# Patient Record
Sex: FEMALE | Race: OTHER | NOT HISPANIC OR LATINO | URBAN - METROPOLITAN AREA
[De-identification: names, ages, dates, MRNs, and addresses within clinical notes are randomized per-mention and may not be internally consistent; named-entity substitution may affect disease eponyms.]

---

## 2017-02-09 ENCOUNTER — INPATIENT (INPATIENT)
Facility: HOSPITAL | Age: 82
LOS: 11 days | Discharge: NURSING, REHAB OR LONG-TERM CARE | DRG: 193 | End: 2017-02-21
Attending: INTERNAL MEDICINE | Admitting: INTERNAL MEDICINE
Payer: MEDICAID

## 2017-02-09 VITALS
OXYGEN SATURATION: 96 % | TEMPERATURE: 97 F | SYSTOLIC BLOOD PRESSURE: 166 MMHG | RESPIRATION RATE: 20 BRPM | HEART RATE: 88 BPM | DIASTOLIC BLOOD PRESSURE: 92 MMHG

## 2017-02-09 LAB
BASE EXCESS BLDV CALC-SCNC: -1 MMOL/L — SIGNIFICANT CHANGE UP
GAS PNL BLDV: SIGNIFICANT CHANGE UP
HCO3 BLDV-SCNC: 24 MMOL/L — SIGNIFICANT CHANGE UP (ref 20–27)
LACTATE SERPL-SCNC: 1.7 MMOL/L — SIGNIFICANT CHANGE UP (ref 0.5–2)
PCO2 BLDV: 39 MMHG — LOW (ref 41–51)
PH BLDV: 7.4 — SIGNIFICANT CHANGE UP (ref 7.32–7.43)
PO2 BLDV: 46 MMHG — SIGNIFICANT CHANGE UP
SAO2 % BLDV: 82 % — SIGNIFICANT CHANGE UP

## 2017-02-09 PROCEDURE — 99291 CRITICAL CARE FIRST HOUR: CPT | Mod: 25

## 2017-02-09 PROCEDURE — 93010 ELECTROCARDIOGRAM REPORT: CPT

## 2017-02-09 RX ORDER — IPRATROPIUM/ALBUTEROL SULFATE 18-103MCG
3 AEROSOL WITH ADAPTER (GRAM) INHALATION
Qty: 0 | Refills: 0 | Status: COMPLETED | OUTPATIENT
Start: 2017-02-09 | End: 2017-02-09

## 2017-02-09 RX ORDER — MAGNESIUM SULFATE 500 MG/ML
2 VIAL (ML) INJECTION ONCE
Qty: 0 | Refills: 0 | Status: COMPLETED | OUTPATIENT
Start: 2017-02-09 | End: 2017-02-09

## 2017-02-09 RX ADMIN — Medication 125 MILLIGRAM(S): at 23:30

## 2017-02-09 RX ADMIN — Medication 3 MILLILITER(S): at 23:44

## 2017-02-09 RX ADMIN — Medication 50 GRAM(S): at 23:42

## 2017-02-09 RX ADMIN — Medication 3 MILLILITER(S): at 23:15

## 2017-02-09 RX ADMIN — Medication 3 MILLILITER(S): at 23:30

## 2017-02-09 NOTE — ED ADULT TRIAGE NOTE - CHIEF COMPLAINT QUOTE
Per family member " She was seen at [facility] and tested positive for flu.  Her asthma is acting up tonight and earlier tonight she had a fever.  I gave her tylenol.

## 2017-02-09 NOTE — ED PROVIDER NOTE - OBJECTIVE STATEMENT
81F hx asthma, hyperthyroid, c/o asthma exacerbation. pt was recently admitted to [facility] (discharged yesterday) for asthma and flu.  pt taking tamiflu, albuterol and prednisone. however SOB and cough worsening today.  pt unable to ambulate without feeling short of breath.  no chest pain. no recent travel. no vomiting today. +subjective fevers. no hx of intubation.

## 2017-02-09 NOTE — ED PROVIDER NOTE - CRITICAL CARE PROVIDED
documentation/additional history taking/direct patient care (not related to procedure)/interpretation of diagnostic studies

## 2017-02-09 NOTE — ED PROVIDER NOTE - PROGRESS NOTE DETAILS
persistent wheezing, pt with supraclavicular retractions, will try bipap and admit for continued treatment. pt refusing bipap, unable to tolerate

## 2017-02-09 NOTE — ED ADULT NURSE NOTE - OBJECTIVE STATEMENT
82 y/o female with hx of asthma arrived to [facility] ER reporting difficulty breathing accompanied with abdominal pain and nausea starting today. Pt verbalized that she had a fever at home and took tylenol. Pt verbalized that she was dx with the flu on 2/7/17 at [facility] and began treatment with tamiflu. Upon assessment, labored breathing noted, abdomen soft, lung fields WNL, pulses palpable. Pt denies headache, chest pain, dizziness, blurred vision, slurred speech, vomiting, diarrhea, chills, LOC, weakness, fatigue, recent travel, and recent injury. Care in progress. Awaiting disposition

## 2017-02-09 NOTE — ED ADULT NURSE NOTE - CHIEF COMPLAINT QUOTE
Per family member " She was seen at [facility] and tested positive for flu.  Her asthma is acting up tonight and earlier tonight she had a fever.  I gave her tylenol.

## 2017-02-10 DIAGNOSIS — F32.9 MAJOR DEPRESSIVE DISORDER, SINGLE EPISODE, UNSPECIFIED: ICD-10-CM

## 2017-02-10 DIAGNOSIS — E05.90 THYROTOXICOSIS, UNSPECIFIED WITHOUT THYROTOXIC CRISIS OR STORM: ICD-10-CM

## 2017-02-10 DIAGNOSIS — J45.901 UNSPECIFIED ASTHMA WITH (ACUTE) EXACERBATION: ICD-10-CM

## 2017-02-10 DIAGNOSIS — Z41.8 ENCOUNTER FOR OTHER PROCEDURES FOR PURPOSES OTHER THAN REMEDYING HEALTH STATE: ICD-10-CM

## 2017-02-10 DIAGNOSIS — R63.8 OTHER SYMPTOMS AND SIGNS CONCERNING FOOD AND FLUID INTAKE: ICD-10-CM

## 2017-02-10 DIAGNOSIS — F41.9 ANXIETY DISORDER, UNSPECIFIED: ICD-10-CM

## 2017-02-10 DIAGNOSIS — J11.1 INFLUENZA DUE TO UNIDENTIFIED INFLUENZA VIRUS WITH OTHER RESPIRATORY MANIFESTATIONS: ICD-10-CM

## 2017-02-10 DIAGNOSIS — Z90.710 ACQUIRED ABSENCE OF BOTH CERVIX AND UTERUS: Chronic | ICD-10-CM

## 2017-02-10 LAB
ALBUMIN SERPL ELPH-MCNC: 3.1 G/DL — LOW (ref 3.4–5)
ALP SERPL-CCNC: 78 U/L — SIGNIFICANT CHANGE UP (ref 40–120)
ALT FLD-CCNC: 23 U/L — SIGNIFICANT CHANGE UP (ref 12–42)
ANION GAP SERPL CALC-SCNC: 12 MMOL/L — SIGNIFICANT CHANGE UP (ref 9–16)
ANION GAP SERPL CALC-SCNC: 8 MMOL/L — LOW (ref 9–16)
APTT BLD: 29.2 SEC — SIGNIFICANT CHANGE UP (ref 27.5–37.4)
AST SERPL-CCNC: 24 U/L — SIGNIFICANT CHANGE UP (ref 15–37)
BASE EXCESS BLDA CALC-SCNC: -3.5 MMOL/L — LOW (ref -2–3)
BASE EXCESS BLDA CALC-SCNC: -5.7 MMOL/L — LOW (ref -2–3)
BASOPHILS NFR BLD AUTO: 0.8 % — SIGNIFICANT CHANGE UP (ref 0–2)
BILIRUB SERPL-MCNC: 0.3 MG/DL — SIGNIFICANT CHANGE UP (ref 0.2–1.2)
BUN SERPL-MCNC: 14 MG/DL — SIGNIFICANT CHANGE UP (ref 7–23)
BUN SERPL-MCNC: 17 MG/DL — SIGNIFICANT CHANGE UP (ref 7–23)
CALCIUM SERPL-MCNC: 8 MG/DL — LOW (ref 8.5–10.5)
CALCIUM SERPL-MCNC: 8 MG/DL — LOW (ref 8.5–10.5)
CHLORIDE SERPL-SCNC: 107 MMOL/L — SIGNIFICANT CHANGE UP (ref 96–108)
CHLORIDE SERPL-SCNC: 108 MMOL/L — SIGNIFICANT CHANGE UP (ref 96–108)
CO2 SERPL-SCNC: 22 MMOL/L — SIGNIFICANT CHANGE UP (ref 22–31)
CO2 SERPL-SCNC: 27 MMOL/L — SIGNIFICANT CHANGE UP (ref 22–31)
CREAT SERPL-MCNC: 0.66 MG/DL — SIGNIFICANT CHANGE UP (ref 0.5–1.3)
CREAT SERPL-MCNC: 0.79 MG/DL — SIGNIFICANT CHANGE UP (ref 0.5–1.3)
EOSINOPHIL NFR BLD AUTO: 0.1 % — SIGNIFICANT CHANGE UP (ref 0–6)
GAS PNL BLDA: SIGNIFICANT CHANGE UP
GAS PNL BLDA: SIGNIFICANT CHANGE UP
GLUCOSE SERPL-MCNC: 107 MG/DL — HIGH (ref 70–99)
GLUCOSE SERPL-MCNC: 270 MG/DL — HIGH (ref 70–99)
HBA1C BLD-MCNC: 6.5 % — HIGH (ref 4.8–5.6)
HCO3 BLDA-SCNC: 18 MMOL/L — LOW (ref 21–28)
HCO3 BLDA-SCNC: 21 MMOL/L — SIGNIFICANT CHANGE UP (ref 21–28)
HCT VFR BLD CALC: 37.6 % — SIGNIFICANT CHANGE UP (ref 34.5–45)
HCT VFR BLD CALC: 39.4 % — SIGNIFICANT CHANGE UP (ref 34.5–45)
HGB BLD-MCNC: 12.5 G/DL — SIGNIFICANT CHANGE UP (ref 11.5–15.5)
HGB BLD-MCNC: 12.9 G/DL — SIGNIFICANT CHANGE UP (ref 11.5–15.5)
INR BLD: 0.99 — SIGNIFICANT CHANGE UP (ref 0.88–1.16)
LYMPHOCYTES # BLD AUTO: 43.3 % — SIGNIFICANT CHANGE UP (ref 13–44)
MAGNESIUM SERPL-MCNC: 2.7 MG/DL — HIGH (ref 1.6–2.4)
MCHC RBC-ENTMCNC: 28.7 PG — SIGNIFICANT CHANGE UP (ref 27–34)
MCHC RBC-ENTMCNC: 29 PG — SIGNIFICANT CHANGE UP (ref 27–34)
MCHC RBC-ENTMCNC: 32.7 G/DL — SIGNIFICANT CHANGE UP (ref 32–36)
MCHC RBC-ENTMCNC: 33.2 G/DL — SIGNIFICANT CHANGE UP (ref 32–36)
MCV RBC AUTO: 87.2 FL — SIGNIFICANT CHANGE UP (ref 80–100)
MCV RBC AUTO: 87.6 FL — SIGNIFICANT CHANGE UP (ref 80–100)
MONOCYTES NFR BLD AUTO: 10.7 % — SIGNIFICANT CHANGE UP (ref 2–14)
NEUTROPHILS NFR BLD AUTO: 45.1 % — SIGNIFICANT CHANGE UP (ref 43–77)
NT-PROBNP SERPL-SCNC: 379 PG/ML — HIGH
PCO2 BLDA: 33 MMHG — SIGNIFICANT CHANGE UP (ref 32–45)
PCO2 BLDA: 35 MMHG — SIGNIFICANT CHANGE UP (ref 32–45)
PH BLDA: 7.37 — SIGNIFICANT CHANGE UP (ref 7.35–7.45)
PH BLDA: 7.39 — SIGNIFICANT CHANGE UP (ref 7.35–7.45)
PHOSPHATE SERPL-MCNC: 2.6 MG/DL — SIGNIFICANT CHANGE UP (ref 2.5–4.5)
PLATELET # BLD AUTO: 228 K/UL — SIGNIFICANT CHANGE UP (ref 150–400)
PLATELET # BLD AUTO: 235 K/UL — SIGNIFICANT CHANGE UP (ref 150–400)
PO2 BLDA: 142 MMHG — HIGH (ref 83–108)
PO2 BLDA: 263 MMHG — HIGH (ref 83–108)
POTASSIUM SERPL-MCNC: 3.5 MMOL/L — SIGNIFICANT CHANGE UP (ref 3.5–5.3)
POTASSIUM SERPL-MCNC: 3.6 MMOL/L — SIGNIFICANT CHANGE UP (ref 3.5–5.3)
POTASSIUM SERPL-SCNC: 3.5 MMOL/L — SIGNIFICANT CHANGE UP (ref 3.5–5.3)
POTASSIUM SERPL-SCNC: 3.6 MMOL/L — SIGNIFICANT CHANGE UP (ref 3.5–5.3)
PROT SERPL-MCNC: 6.7 G/DL — SIGNIFICANT CHANGE UP (ref 6.4–8.2)
PROTHROM AB SERPL-ACNC: 11 SEC — SIGNIFICANT CHANGE UP (ref 10–13.1)
RBC # BLD: 4.31 M/UL — SIGNIFICANT CHANGE UP (ref 3.8–5.2)
RBC # BLD: 4.5 M/UL — SIGNIFICANT CHANGE UP (ref 3.8–5.2)
RBC # FLD: 12.8 % — SIGNIFICANT CHANGE UP (ref 10.3–16.9)
RBC # FLD: 13 % — SIGNIFICANT CHANGE UP (ref 10.3–16.9)
SAO2 % BLDA: 100 % — SIGNIFICANT CHANGE UP (ref 95–100)
SAO2 % BLDA: 99 % — SIGNIFICANT CHANGE UP (ref 95–100)
SODIUM SERPL-SCNC: 141 MMOL/L — SIGNIFICANT CHANGE UP (ref 135–145)
SODIUM SERPL-SCNC: 143 MMOL/L — SIGNIFICANT CHANGE UP (ref 135–145)
T3 SERPL-MCNC: 109 NG/DL — SIGNIFICANT CHANGE UP (ref 80–200)
T4 AB SER-ACNC: 10.8 UG/DL — SIGNIFICANT CHANGE UP (ref 3.17–11.72)
THEOPHYLLINE SERPL-MCNC: <2 UG/ML — LOW (ref 10–20)
TSH SERPL-MCNC: <0.004 UIU/ML — LOW (ref 0.35–4.94)
WBC # BLD: 15.1 K/UL — HIGH (ref 3.8–10.5)
WBC # BLD: 9.1 K/UL — SIGNIFICANT CHANGE UP (ref 3.8–10.5)
WBC # FLD AUTO: 15.1 K/UL — HIGH (ref 3.8–10.5)
WBC # FLD AUTO: 9.1 K/UL — SIGNIFICANT CHANGE UP (ref 3.8–10.5)

## 2017-02-10 PROCEDURE — 71020: CPT | Mod: 26

## 2017-02-10 PROCEDURE — 99222 1ST HOSP IP/OBS MODERATE 55: CPT | Mod: GC

## 2017-02-10 PROCEDURE — 99223 1ST HOSP IP/OBS HIGH 75: CPT | Mod: GC

## 2017-02-10 RX ORDER — HEPARIN SODIUM 5000 [USP'U]/ML
5000 INJECTION INTRAVENOUS; SUBCUTANEOUS EVERY 12 HOURS
Qty: 0 | Refills: 0 | Status: DISCONTINUED | OUTPATIENT
Start: 2017-02-10 | End: 2017-02-10

## 2017-02-10 RX ORDER — ALBUTEROL 90 UG/1
2.5 AEROSOL, METERED ORAL
Qty: 0 | Refills: 0 | Status: COMPLETED | OUTPATIENT
Start: 2017-02-10 | End: 2017-02-10

## 2017-02-10 RX ORDER — IPRATROPIUM/ALBUTEROL SULFATE 18-103MCG
3 AEROSOL WITH ADAPTER (GRAM) INHALATION EVERY 4 HOURS
Qty: 0 | Refills: 0 | Status: DISCONTINUED | OUTPATIENT
Start: 2017-02-10 | End: 2017-02-21

## 2017-02-10 RX ORDER — INSULIN LISPRO 100/ML
VIAL (ML) SUBCUTANEOUS
Qty: 0 | Refills: 0 | Status: DISCONTINUED | OUTPATIENT
Start: 2017-02-10 | End: 2017-02-21

## 2017-02-10 RX ORDER — FLUTICASONE PROPIONATE AND SALMETEROL 50; 250 UG/1; UG/1
1 POWDER ORAL; RESPIRATORY (INHALATION)
Qty: 0 | Refills: 0 | Status: DISCONTINUED | OUTPATIENT
Start: 2017-02-10 | End: 2017-02-16

## 2017-02-10 RX ORDER — IPRATROPIUM/ALBUTEROL SULFATE 18-103MCG
3 AEROSOL WITH ADAPTER (GRAM) INHALATION ONCE
Qty: 0 | Refills: 0 | Status: COMPLETED | OUTPATIENT
Start: 2017-02-10 | End: 2017-02-10

## 2017-02-10 RX ORDER — INFLUENZA VIRUS VACCINE 15; 15; 15; 15 UG/.5ML; UG/.5ML; UG/.5ML; UG/.5ML
0.5 SUSPENSION INTRAMUSCULAR ONCE
Qty: 0 | Refills: 0 | Status: DISCONTINUED | OUTPATIENT
Start: 2017-02-10 | End: 2017-02-10

## 2017-02-10 RX ORDER — IPRATROPIUM/ALBUTEROL SULFATE 18-103MCG
3 AEROSOL WITH ADAPTER (GRAM) INHALATION EVERY 6 HOURS
Qty: 0 | Refills: 0 | Status: DISCONTINUED | OUTPATIENT
Start: 2017-02-10 | End: 2017-02-10

## 2017-02-10 RX ORDER — HEPARIN SODIUM 5000 [USP'U]/ML
5000 INJECTION INTRAVENOUS; SUBCUTANEOUS EVERY 8 HOURS
Qty: 0 | Refills: 0 | Status: DISCONTINUED | OUTPATIENT
Start: 2017-02-10 | End: 2017-02-21

## 2017-02-10 RX ORDER — SODIUM CHLORIDE 9 MG/ML
250 INJECTION INTRAMUSCULAR; INTRAVENOUS; SUBCUTANEOUS ONCE
Qty: 0 | Refills: 0 | Status: DISCONTINUED | OUTPATIENT
Start: 2017-02-10 | End: 2017-02-10

## 2017-02-10 RX ORDER — MOMETASONE FUROATE 220 UG/1
1 INHALANT RESPIRATORY (INHALATION)
Qty: 0 | Refills: 0 | Status: DISCONTINUED | OUTPATIENT
Start: 2017-02-10 | End: 2017-02-10

## 2017-02-10 RX ADMIN — Medication 3 MILLILITER(S): at 04:10

## 2017-02-10 RX ADMIN — Medication 40 MILLIGRAM(S): at 05:46

## 2017-02-10 RX ADMIN — Medication 30 MILLIGRAM(S): at 10:00

## 2017-02-10 RX ADMIN — HEPARIN SODIUM 5000 UNIT(S): 5000 INJECTION INTRAVENOUS; SUBCUTANEOUS at 22:00

## 2017-02-10 RX ADMIN — ALBUTEROL 2.5 MILLIGRAM(S): 90 AEROSOL, METERED ORAL at 00:50

## 2017-02-10 RX ADMIN — ALBUTEROL 2.5 MILLIGRAM(S): 90 AEROSOL, METERED ORAL at 01:10

## 2017-02-10 RX ADMIN — Medication 3 MILLILITER(S): at 17:43

## 2017-02-10 RX ADMIN — Medication 3 MILLILITER(S): at 21:20

## 2017-02-10 RX ADMIN — Medication: at 12:32

## 2017-02-10 RX ADMIN — Medication 3 MILLILITER(S): at 10:17

## 2017-02-10 RX ADMIN — Medication 3 MILLILITER(S): at 05:45

## 2017-02-10 RX ADMIN — Medication 30 MILLIGRAM(S): at 17:24

## 2017-02-10 RX ADMIN — HEPARIN SODIUM 5000 UNIT(S): 5000 INJECTION INTRAVENOUS; SUBCUTANEOUS at 10:00

## 2017-02-10 RX ADMIN — Medication: at 07:32

## 2017-02-10 RX ADMIN — Medication 3 MILLILITER(S): at 14:46

## 2017-02-10 NOTE — PROGRESS NOTE ADULT - SUBJECTIVE AND OBJECTIVE BOX
HPI:    SUBJECTIVE: Patient seen and examined at bedside.     ROS:  CV: Denies chest pain  Resp: Denies SOB  GI: Denies abdominal pain, constipation, diarrhea, nausea, vomiting  : Denies dysuria  ID: Denies fevers, chills  MSK: Denies joint pain     OBJECTIVE:    VITAL SIGNS:  ICU Vital Signs Last 24 Hrs  T(C): 36.7, Max: 36.7 (02-10 @ 14:30)  T(F): 98.1, Max: 98.1 (02-10 @ 14:30)  HR: 84 (70 - 96)  BP: 116/50 (99/65 - 166/92)  BP(mean): 71 (69 - 92)  ABP: --  ABP(mean): --  RR: 32 (15 - 40)  SpO2: 99% (95% - 100%)        CAPILLARY BLOOD GLUCOSE  231 (10 Feb 2017 12:00)      PHYSICAL EXAM:    General: NAD, comfortable  HEENT: NCAT, PERRL, clear conjunctiva, no scleral icterus  Neck: supple, no JVD  Respiratory: CTA b/l, diffuse inspiratory and expiratory wheezing  Cardiovascular: RRR, normal S1S2, no M/R/G  Abdomen: soft, NT/ND, bowel sounds in all four quadrants, no palpable masses  Extremities: WWP, no clubbing, cyanosis, or edema  Neuro:     MEDICATIONS:  MEDICATIONS  (STANDING):  methimazole 10milliGRAM(s) Oral every 8 hours  ALBUTerol/ipratropium for Nebulization 3milliLiter(s) Nebulizer every 4 hours  insulin lispro (HumaLOG) corrective regimen sliding scale  SubCutaneous Before meals and at bedtime  fluticasone / salmeterol 250-50 MICROgram(s) Diskus 1Dose(s) Inhalation two times a day  oseltamivir 30milliGRAM(s) Oral every 12 hours  heparin  Injectable 5000Unit(s) SubCutaneous every 8 hours    MEDICATIONS  (PRN):      ALLERGIES:  Allergies    No Known Allergies    Intolerances        LABS:                        12.9   9.1   )-----------( 228      ( 10 Feb 2017 06:00 )             39.4     10 Feb 2017 06:00    141    |  107    |  14     ----------------------------<  270    3.6     |  22     |  0.66     Ca    8.0        10 Feb 2017 06:00  Phos  2.6       10 Feb 2017 06:00  Mg     2.7       10 Feb 2017 06:00    TPro  6.7    /  Alb  3.1    /  TBili  0.3    /  DBili  x      /  AST  24     /  ALT  23     /  AlkPhos  78     09 Feb 2017 23:39    PT/INR - ( 09 Feb 2017 23:39 )   PT: 11.0 sec;   INR: 0.99          PTT - ( 09 Feb 2017 23:39 )  PTT:29.2 sec      RADIOLOGY & ADDITIONAL TESTS: Reviewed.    Pulm  #Asthma exacerbation. Admitted to MICU on 2/10. Pt with asthma x 25 yrs, no intubations, recent (Feb 5-7) admission to [facility] for asthma exacerbation 2/2 influenza, here with worsening respiratory status likely due to inability to complete fill medications 2/2 underinsurance. Per patient's son (an anesthesiologist in [employer] now living in [place] 7yrs), he was giving her albuterol and nasonex at home, was unable to procure other medications. Pt placed on bipap in ED but was removing to go to restroom, resulting in worsening respiratory status, tachypnea. Respiratory status continued to improve on bipap. Pt now on NC satting well.   -c/w solumedrol 40 q8  -tamiflu  -duonebs 3ml q4h  -f/u IGE serum quant    Endo  #Hyperthyroidism  -c/w methimazole 10mg PO q8h  #Steroid use  -c/w ISS HPI:    SUBJECTIVE: Patient seen and examined at bedside.     ROS:  CV: Denies chest pain  Resp: Denies SOB  GI: Denies abdominal pain, constipation, diarrhea, nausea, vomiting  : Denies dysuria  ID: Denies fevers, chills  MSK: Denies joint pain     OBJECTIVE:    VITAL SIGNS:  ICU Vital Signs Last 24 Hrs  T(C): 36.7, Max: 36.7 (02-10 @ 14:30)  T(F): 98.1, Max: 98.1 (02-10 @ 14:30)  HR: 84 (70 - 96)  BP: 116/50 (99/65 - 166/92)  BP(mean): 71 (69 - 92)  ABP: --  ABP(mean): --  RR: 32 (15 - 40)  SpO2: 99% (95% - 100%)        CAPILLARY BLOOD GLUCOSE  231 (10 Feb 2017 12:00)      PHYSICAL EXAM:    General: NAD, comfortable  HEENT: NCAT, PERRL, clear conjunctiva, no scleral icterus  Neck: supple, no JVD  Respiratory: CTA b/l, diffuse inspiratory and expiratory wheezing  Cardiovascular: RRR, normal S1S2, no M/R/G  Abdomen: soft, NT/ND, bowel sounds in all four quadrants, no palpable masses  Extremities: WWP, no clubbing, cyanosis, or edema  Neuro:     MEDICATIONS:  MEDICATIONS  (STANDING):  methimazole 10milliGRAM(s) Oral every 8 hours  ALBUTerol/ipratropium for Nebulization 3milliLiter(s) Nebulizer every 4 hours  insulin lispro (HumaLOG) corrective regimen sliding scale  SubCutaneous Before meals and at bedtime  fluticasone / salmeterol 250-50 MICROgram(s) Diskus 1Dose(s) Inhalation two times a day  oseltamivir 30milliGRAM(s) Oral every 12 hours  heparin  Injectable 5000Unit(s) SubCutaneous every 8 hours    MEDICATIONS  (PRN):      ALLERGIES:  Allergies    No Known Allergies    Intolerances        LABS:                        12.9   9.1   )-----------( 228      ( 10 Feb 2017 06:00 )             39.4     10 Feb 2017 06:00    141    |  107    |  14     ----------------------------<  270    3.6     |  22     |  0.66     Ca    8.0        10 Feb 2017 06:00  Phos  2.6       10 Feb 2017 06:00  Mg     2.7       10 Feb 2017 06:00    TPro  6.7    /  Alb  3.1    /  TBili  0.3    /  DBili  x      /  AST  24     /  ALT  23     /  AlkPhos  78     09 Feb 2017 23:39    PT/INR - ( 09 Feb 2017 23:39 )   PT: 11.0 sec;   INR: 0.99          PTT - ( 09 Feb 2017 23:39 )  PTT:29.2 sec      RADIOLOGY & ADDITIONAL TESTS: Reviewed.    Pulm  #Asthma exacerbation. Admitted to MICU on 2/10. Pt with asthma x 25 yrs, no intubations, recent (Feb 5-7) admission to [facility] for asthma exacerbation 2/2 influenza, here with worsening respiratory status likely due to inability to complete fill medications 2/2 underinsurance. Per patient's son (an anesthesiologist in [employer] now living in [place]yrs), he was giving her albuterol and nasonex at home, was unable to procure other medications. Pt placed on bipap in ED but was removing to go to restroom, resulting in worsening respiratory status, tachypnea. Respiratory status continued to improve on bipap. Pt now on NC satting well.   -c/w solumedrol 40 q8  -tamiflu  -duonebs 3ml q4h  -f/u IGE serum quant    Endo  #Hyperthyroidism  -c/w methimazole 10mg PO q8h  #Steroid use  -c/w ISS    FEN: No IVF, replete lytes PRN, Consistent Carb w/ evening snack  PPX: HSQ  LINES: Peripheral   Walker: None  Dispo: MICU  Code: Full

## 2017-02-10 NOTE — ED ADULT NURSE REASSESSMENT NOTE - NS ED NURSE REASSESS COMMENT FT1
spoke to dr parr, aware pt is tachypneic to RR 40, saturating well on o2 3l NC, albuterol neb in progress. MD aware pt continuing to refuse bipap, also aware that patient just ate a croissant. discussed w son the importance of the bipap machine, states that will try again in 30 min. also, awaiting dr parr to come discuss with patient the importance of bipap,  phone at bedside. initiated on continuos pulse ox. will monitor

## 2017-02-10 NOTE — CONSULT NOTE ADULT - ATTENDING COMMENTS
82F as per her son who is anesthesiologist in [employer], she has asthma for 20 y, nonsmoker, goes to hospital for 1-2 days x2/per year. no intubations. exertional tolerance previously excellent, worsened in last year to 1 block. recently treated in ED at [facility] for asthma for 3 days, DC home 2-7. felt improved and able to eat/drink normally but worsened 2-9 evening. currently speaking voluntarily about various subjects. polyphonic low pitched wheeze. plan to admit to MICU, treat alburerol q2, steroids, azithromycin, BIPAP. 82F as per her son who is anesthesiologist in [employer], she has asthma for 20 y, nonsmoker, goes to hospital for 1-2 days x2/per year. no intubations. exertional tolerance previously excellent, worsened in last year to 1 block. recently treated in ED at [facility] for asthma for 3 days, DC home 2-7. felt improved and able to eat/drink normally but worsened 2-9 evening. currently speaking voluntarily about various subjects. polyphonic low pitched wheeze. plan to admit to MICU, treat alburerol q2, steroids, azithromycin, BIPAP.  also of note, recently diagnosed with hyperthyroid at [facility], will recheck TFTs. 82F as per her son who is anesthesiologist in [employer] [facility], she has asthma for 20 y, nonsmoker, goes to hospital for 1-2 days x2/per year. no intubations. exertional tolerance previously excellent, worsened in last year to 1 block. recently treated in ED at [facility] for asthma for 3 days, DC home 2-7. felt improved and able to eat/drink normally but worsened 2-9 evening. currently speaking voluntarily about various subjects. RR 20's Vt 300's, polyphonic low pitched wheeze. plan to admit to MICU, treat alburerol q2, steroids, azithromycin, BIPAP.  also of note, recently diagnosed with hyperthyroid at [facility], will recheck TFTs.

## 2017-02-10 NOTE — CONSULT NOTE ADULT - PROBLEM SELECTOR RECOMMENDATION 9
Pt with asthma x 25 yrs, no intubations, recent (Feb 5-7) admission to [facility] for asthma exacerbation 2/2 influenza, here with worsening respiratory status likely due to inability to complete fill medications 2/2 underinsurance.   -Pt placed on bipap in ED but was removing to go to restroom, resulting in worsening respiratory status, tachypnea Pt with asthma x 25 yrs, no intubations, recent (Feb 5-7) admission to [facility] for asthma exacerbation 2/2 influenza, here with worsening respiratory status likely due to inability to complete fill medications 2/2 underinsurance. Per patient's son (an anesthesiologist in [employer] now living in [place]), he was giving her albuterol and nasonex at home, was unable to procure other medications.   -Pt placed on bipap in ED but was removing to go to restroom, resulting in worsening respiratory status, tachypnea  -ABG concerning due to normalization of CO2 - may indicate that patient tiring out.   -respiratory status now improving on bipap, f/u repeat ABG  -solumedrol 40 q8  -tamiflu  -nebs q2 standing

## 2017-02-10 NOTE — H&P ADULT. - ASSESSMENT
83yo F w/ PMH asthma presenting with asthma exacerbation in setting of recent influenza and noncompliance with medications.

## 2017-02-10 NOTE — H&P ADULT. - PROBLEM SELECTOR PLAN 2
recently diagnosed with influenza at [facility]. Completed 3 days of Tamiflu prior to discharge on 02/07.    - Continue Tamiflu 75mg BID   - Droplet precautions   - Con't supportive therapy

## 2017-02-10 NOTE — H&P ADULT. - ATTENDING COMMENTS
pt seen and examined in ED holding on 2/10/17 prior to ICU consult , with admitting resident Dr. Jacques and pt's son present in room  pt son and  services used (pt is Polish speaking)  reviewed h&p, ekg, vs, labs, CXR  HPI as noted above, pt dcd from [facility] for asthma / flu ; came to [facility] ED with worsening asthma sxs, treated for flu for 3 days  Pt at beside speaking full sentences but  tachypneic w/ increased WOB w/ rhonchi/ severe wheezing heard audibly and on lung examination ; despite being given 125mg solumederl and 2 duoneb treatment; pt's son also reported that pt w/ abodminal pain and 2 episodes of loose stools s/p dc from [facility]; pt on exam w/ mild lower abdominal tenderness, no guarding, no rebound  a/p:   1. asthma/ flu/ respiratory distress: pt not tolerating Bipap, has not used device before; pt w/ increased WOB pt seen and examined in ED holding on 2/10/17 prior to ICU consult , with admitting resident Dr. Jacques and pt's son present in room  pt son and  services used (pt is Hebrew speaking)  reviewed h&p, ekg, vs, labs, CXR  HPI as noted above, pt dcd from [facility] for asthma / flu ; came to [facility] ED with worsening asthma sxs, treated for flu for 3 days  Pt at beside speaking full sentences but  tachypneic w/ increased WOB w/ rhonchi/ severe wheezing heard audibly and on lung examination ; despite being given 125mg solumederl and 2 duoneb treatment; pt's son also reported that pt w/ abodminal pain and 2 episodes of loose stools s/p dc from [facility]; pt on exam w/ mild lower abdominal tenderness, no guarding, no rebound  a/p:   1. asthma/ flu/ respiratory distress: initial ABG noted ; pt not tolerating Bipap initially in ED, has not used device before; pt w/ increased WOB; ICU consulted and pt accepted to ICU for closer monitoring of respiratory status  2. hyperthyroidism: plan as above  3. abdominal discomfort/ diarrhea: possibly related to flu sxs, monitor for now, if worsening obtain imaging and appropriate stool studies pt seen and examined in ED holding on 2/10/17 prior to ICU consult , with admitting resident Dr. Jacques and pt's son present in room  pt son and  services used (pt is Icelandic speaking)  reviewed h&p, ekg, vs, labs, CXR  HPI as noted above, pt dcd from [facility] for asthma / flu ; came to [facility] ED with worsening asthma sxs, treated for flu for 3 days  Pt at beside speaking full sentences but  tachypneic w/ increased WOB w/ rhonchi/ severe wheezing heard audibly and on lung examination ; despite being given 125mg solumedrol and multiple albuterol/ duoneb treatments; pt's son also reported that pt w/ abdominal pain and 2 episodes of loose stools s/p dc from [facility]; pt on exam w/ mild lower abdominal tenderness, no guarding, no rebound  a/p:   1. asthma/ flu/ respiratory distress: initial ABG noted ; pt not tolerating Bipap initially in ED, has not used device before; pt w/ increased WOB; ICU consulted and pt accepted to ICU for closer monitoring of respiratory status  2. hyperthyroidism: plan as above  3. abdominal discomfort/ diarrhea: possibly related to flu sxs, monitor for now, if worsening obtain imaging and appropriate stool studies pt seen and examined in ED holding on 2/10/17 prior to ICU consult , with admitting resident Dr. Jacques and pt's son present in room  pt son and  services used (pt is Pashto speaking)  reviewed h&p, ekg, vs, labs, CXR  HPI as noted above, pt dcd from [facility] for asthma / flu ; came to [facility] ED with worsening asthma sxs, treated for flu for 3 days  Pt at bedside speaking full sentences but  tachypneic w/ increased WOB w/ rhonchi/ severe wheezing heard audibly and on lung examination ; despite being given 125mg solumedrol and multiple albuterol/ duoneb treatments; pt's son also reported that pt w/ abdominal pain and 2 episodes of loose stools s/p dc from [facility]; pt on exam w/ mild lower abdominal tenderness, no guarding, no rebound  a/p:   1. asthma/ flu/ respiratory distress: initial ABG noted ; pt not tolerating Bipap initially in ED, has not used device before; pt w/ increased WOB; ICU consulted and pt accepted to ICU for closer monitoring of respiratory status  2. hyperthyroidism: plan as above  3. abdominal discomfort/ diarrhea: possibly related to flu sxs, monitor for now, if worsening obtain imaging and appropriate stool studies

## 2017-02-10 NOTE — H&P ADULT. - PROBLEM SELECTOR PLAN 1
severe exacerbation in setting of noncompliance with medications and recent influenza. Currently with increased work of breathing, continuously removing her BiPAP. Elevated WBC likely secondary to recent steroid use. Patient remains afebrile without localizing symptoms of bacterial infection.   - informed patient via Icelandic  service regarding risks of refusing BiPAP, including increased respiratory distress and the need for intubation. Patient verbalizes understanding and agrees to wear BiPAP.    - Obtaining ICU consult for need for close monitoring on telemetry and possible need for intubation for increased work of breathing.    - Solu-medrol 40mg Q8H, inhaled corticosteroid, Duonebs Q2-4H, Con't BiPAP   - PPI, VICTORIA while on steroids

## 2017-02-10 NOTE — CONSULT NOTE ADULT - SUBJECTIVE AND OBJECTIVE BOX
Pt is an 81 yo female recent immigrant (Nov 2016, Ukrainian-speaking) with PMH asthma with no prior intubations, hyperthyroidism, anxiety, depression, presenting to ED with respiratory distress. Patient had a very recent 3-day hospitalization at [facility], discharged 2/7/2017, was treated for asthma exacerbation 2/2 influenza. Because of tenuous insurance situation, patient was unable to get all her medications filled after discharge; son (who is an anesthesiologist back home) had been giving her albuterol and nasonex that he purchased, but pt's respiratory status worsened last night so he brought her to ED. In ED, pt wheezed, placed on bipap. Placed in isolation room due to influenza, was not witnessed taking off her bipap. Floor team to whom patient was endorsed noted her to be tachypneic to 40's with accessory muscle use, so ICU consulted for possible escalation of care 2/2 respiratory distress.     On my initial exam in ED, pt tachypneic to 30-40 on bipap with respiratory therapist at bedside, increased work of breathing and accessory muscle use. Concern that patient would need to be emergently intubated, however after a few minutes on bipap patient appeared more comfortable, speaking in full sentences. Patient c/o sob, but no other complaints at present, understanding of gravity of situation when explained, but very anxious and does not want to be intubated. Denies fevers/chills except for before [facility] hospitalization.     PAST MEDICAL & SURGICAL HISTORY:  Hyperthyroidism  Asthma  Influenza  S/P hysterectomy    FAMILY HISTORY:  NC    Social History:Non smoker    MEDICATIONS  (STANDING):  oseltamivir 75milliGRAM(s) Oral two times a day  heparin  Injectable 5000Unit(s) SubCutaneous every 12 hours  methimazole 10milliGRAM(s) Oral every 8 hours  mometasone 220 MICROgram(s) Inhaler 1Puff(s) Inhalation two times a day  methylPREDNISolone sodium succinate Injectable 40milliGRAM(s) IV Push every 8 hours  ALBUTerol/ipratropium for Nebulization 3milliLiter(s) Nebulizer every 4 hours    MEDICATIONS  (PRN):   Meds reviewed    Allergies    No Known Allergies    Intolerances     ? lasix    REVIEW OF SYSTEMS:    CONSTITUTIONAL:  sob,   EYES: No eye pain, visual disturbances, or discharge  ENMT:  No difficulty hearing, tinnitus, vertigo; No sinus or throat pain  NECK: No pain or stiffness  BREASTS: No pain, masses, or nipple discharge  RESPIRATORY: sob  CARDIOVASCULAR: No chest pain, palpitations, dizziness,   GASTROINTESTINAL: No abdominal or epigastric pain. No nausea, vomiting, or hematemesis; No diarrhea or constipation. No melena or hematochezia  GENITOURINARY: No dysuria, frequency, hematuria, or incontinence  NEUROLOGICAL: No headaches, memory loss, loss of strength, numbness, or tremors  SKIN: Diffuse erythema, no blisters  LYMPH NODES: No enlarged glands  ENDOCRINE: No heat or cold intolerance; No hair loss  MUSCULOSKELETAL: Chronic lymphedema legs with scars  PSYCHIATRIC: depression, anxiety,  HEME/LYMPH: No easy bruising, or bleeding gums  ALLERY AND IMMUNOLOGIC: No hives or eczema      Vital Signs Last 24 Hrs  T(C): 36.6, Max: 36.6 (02-10 @ 05:45)  T(F): 97.8, Max: 97.8 (02-10 @ 05:45)  HR: 84 (70 - 88)  BP: 120/70 (99/65 - 166/92)  BP(mean): --  RR: 26 (16 - 40)  SpO2: 100% (95% - 100%)  Daily     Daily     PHYSICAL EXAM:    GENERAL: well-nourished, moderate respiratory distress  HEAD:  Atraumatic, Normocephalic  EYES: EOMI, PERRLA, conjunctiva and sclera clear  ENMT: No tonsillar erythema, exudates, or enlargement; Moist mucous membranes, Good dentition, No lesions  NECK: Supple, neck  veins full  NERVOUS SYSTEM:  Alert & Oriented X3, Good concentration; Motor Strength wnl upper and lower extremities;  CHEST/LUNG: Diffuse inspiratory and expiratory wheezes, diffuse rales, minimal rhonchi  HEART: Regular rate and rhythm; No murmurs, rubs, or gallops  ABDOMEN: Soft, Nontender, Nondistended; Bowel sounds present, body wall and flank edema  EXTREMITIES: wwp  LYMPH: No lymphadenopathy noted  SKIN: No rashes or lesions, pale      LABS:                        12.9   9.1   )-----------( 228      ( 10 Feb 2017 06:00 )             39.4     09 Feb 2017 23:39    143    |  108    |  17     ----------------------------<  107    3.5     |  27     |  0.79     Ca    8.0        09 Feb 2017 23:39    TPro  6.7    /  Alb  3.1    /  TBili  0.3    /  DBili  x      /  AST  24     /  ALT  23     /  AlkPhos  78     09 Feb 2017 23:39    PT/INR - ( 09 Feb 2017 23:39 )   PT: 11.0 sec;   INR: 0.99          PTT - ( 09 Feb 2017 23:39 )  PTT:29.2 sec      ABG - ( 10 Feb 2017 04:39 )  pH: 7.39  /  pCO2: 35    /  pO2: 142   / HCO3: 21    / Base Excess: -3.5  /  SaO2: 99                    RADIOLOGY & ADDITIONAL TESTS:

## 2017-02-10 NOTE — H&P ADULT. - RS GEN PE MLT RESP DETAILS PC
breath sounds equal/respirations labored/intercostal retractions/no rales/no rhonchi/airway patent/wheezes

## 2017-02-10 NOTE — H&P ADULT. - HISTORY OF PRESENT ILLNESS
82F recently immigrated from [facility] 11/2016 PMH hyperthyroidism, 25y h/o asthma (recent ER visit at [facility] for exacerbation w/ +flu received albuterol/prednisone/tamiflu, d/c-ed on theophylline; no hx of intubations). Pt was unable to fill her meds 2/2 insurance issues and now presents w/ worsening dyspnea, cough, and inability to minimally ambulate w/o shortness of breath. No travel since moving here.    ROS: POS subjective fevers and as above. NEG CP    ED VS: Initially T 97.4F, HR 80s, BP /65-92, RR 20, satting at 96% on room air  In ED pt received 2g Mg, Solumedrol 125mg IVP x1, Duonebs, and placed on BiPAP for respiratory distress. She began intermittently taking off mask and ambulating at which time she became progressively more dyspneic w/ sat coming down to 95% on RA off BiPAP and subsequently RR up to 40.  Labs: WBC 15.1, ABG 7.39/35/142/21, serum Bicarb 27    ICU consult called for acute asthma exacerbation w/ increased pCO2. 82F recently immigrated from [facility] 11/2016 PMH hyperthyroidism, 25y h/o asthma (recent ER visit at [facility] for exacerbation w/ +flu received albuterol/prednisone/tamiflu, d/c-ed on theophylline; no hx of intubations). Pt was unable to fill her meds 2/2 insurance issues and now presents w/ worsening dyspnea, cough, and inability to minimally ambulate w/o shortness of breath. No travel since moving here. Symptoms are associated with subjective fevers, which the son states have been ongoing "for 5 years". She has had 3 additional hospitalizations for asthma this past year, and her exacerbations seem to the son to have worsened since she moved to the [place]. When she first arrived, she could walk without effort, and now must stop every 50 feet to rest. Her most recent hospital course was complicated by one episode of delirium thought secondary to steroid use that has since resolved.      ROS: POS subjective fevers and as above. NEG CP    ED VS: Initially T 97.4F, HR 80s, BP /65-92, RR 20, satting at 96% on room air  In ED pt received 2g Mg, Solumedrol 125mg IVP x1, Duonebs, and placed on BiPAP for respiratory distress. She began intermittently taking off mask and ambulating at which time she became progressively more dyspneic w/ sat coming down to 95% on RA off BiPAP and subsequently RR up to 40.  Labs: WBC 15.1, ABG 7.39/35/142/21, serum Bicarb 27    ICU consult called for acute asthma exacerbation w/ increased pCO2.

## 2017-02-10 NOTE — CONSULT NOTE ADULT - ASSESSMENT
83 yo female with PMH asthma, hyperthyroidism, depression, anxiety, admitted with asthma exascebation 2/2 influenza, with increased work of breathing.

## 2017-02-10 NOTE — H&P ADULT. - PROBLEM SELECTOR PLAN 3
recent diagnosis of hyperthyroidism at [facility], discharged on Methimazole, which the patient has not been able to fill.    - Check thyroid panel, will obtain collateral from [facility]

## 2017-02-11 LAB
ANION GAP SERPL CALC-SCNC: 7 MMOL/L — LOW (ref 9–16)
BUN SERPL-MCNC: 12 MG/DL — SIGNIFICANT CHANGE UP (ref 7–23)
CALCIUM SERPL-MCNC: 8.5 MG/DL — SIGNIFICANT CHANGE UP (ref 8.5–10.5)
CHLORIDE SERPL-SCNC: 109 MMOL/L — HIGH (ref 96–108)
CO2 SERPL-SCNC: 26 MMOL/L — SIGNIFICANT CHANGE UP (ref 22–31)
CREAT SERPL-MCNC: 0.58 MG/DL — SIGNIFICANT CHANGE UP (ref 0.5–1.3)
GLUCOSE SERPL-MCNC: 135 MG/DL — HIGH (ref 70–99)
HCT VFR BLD CALC: 36.6 % — SIGNIFICANT CHANGE UP (ref 34.5–45)
HGB BLD-MCNC: 12 G/DL — SIGNIFICANT CHANGE UP (ref 11.5–15.5)
MAGNESIUM SERPL-MCNC: 2.5 MG/DL — HIGH (ref 1.6–2.4)
MCHC RBC-ENTMCNC: 29.1 PG — SIGNIFICANT CHANGE UP (ref 27–34)
MCHC RBC-ENTMCNC: 32.8 G/DL — SIGNIFICANT CHANGE UP (ref 32–36)
MCV RBC AUTO: 88.6 FL — SIGNIFICANT CHANGE UP (ref 80–100)
PHOSPHATE SERPL-MCNC: 2.6 MG/DL — SIGNIFICANT CHANGE UP (ref 2.5–4.5)
PLATELET # BLD AUTO: 212 K/UL — SIGNIFICANT CHANGE UP (ref 150–400)
POTASSIUM SERPL-MCNC: 3.5 MMOL/L — SIGNIFICANT CHANGE UP (ref 3.5–5.3)
POTASSIUM SERPL-SCNC: 3.5 MMOL/L — SIGNIFICANT CHANGE UP (ref 3.5–5.3)
RBC # BLD: 4.13 M/UL — SIGNIFICANT CHANGE UP (ref 3.8–5.2)
RBC # FLD: 13.4 % — SIGNIFICANT CHANGE UP (ref 10.3–16.9)
SODIUM SERPL-SCNC: 142 MMOL/L — SIGNIFICANT CHANGE UP (ref 135–145)
WBC # BLD: 17.1 K/UL — HIGH (ref 3.8–10.5)
WBC # FLD AUTO: 17.1 K/UL — HIGH (ref 3.8–10.5)

## 2017-02-11 PROCEDURE — 99233 SBSQ HOSP IP/OBS HIGH 50: CPT | Mod: GC

## 2017-02-11 RX ORDER — ALBUTEROL 90 UG/1
2.5 AEROSOL, METERED ORAL ONCE
Qty: 0 | Refills: 0 | Status: COMPLETED | OUTPATIENT
Start: 2017-02-11 | End: 2017-02-11

## 2017-02-11 RX ORDER — POTASSIUM CHLORIDE 20 MEQ
40 PACKET (EA) ORAL ONCE
Qty: 0 | Refills: 0 | Status: COMPLETED | OUTPATIENT
Start: 2017-02-11 | End: 2017-02-11

## 2017-02-11 RX ADMIN — Medication 3 MILLILITER(S): at 09:36

## 2017-02-11 RX ADMIN — Medication 20 MILLIGRAM(S): at 12:08

## 2017-02-11 RX ADMIN — Medication 3 MILLILITER(S): at 05:40

## 2017-02-11 RX ADMIN — FLUTICASONE PROPIONATE AND SALMETEROL 1 DOSE(S): 50; 250 POWDER ORAL; RESPIRATORY (INHALATION) at 18:10

## 2017-02-11 RX ADMIN — Medication 30 MILLIGRAM(S): at 18:10

## 2017-02-11 RX ADMIN — HEPARIN SODIUM 5000 UNIT(S): 5000 INJECTION INTRAVENOUS; SUBCUTANEOUS at 06:17

## 2017-02-11 RX ADMIN — Medication 3 MILLILITER(S): at 14:21

## 2017-02-11 RX ADMIN — Medication 6: at 12:07

## 2017-02-11 RX ADMIN — Medication 2: at 16:48

## 2017-02-11 RX ADMIN — Medication 3 MILLILITER(S): at 21:38

## 2017-02-11 RX ADMIN — FLUTICASONE PROPIONATE AND SALMETEROL 1 DOSE(S): 50; 250 POWDER ORAL; RESPIRATORY (INHALATION) at 06:16

## 2017-02-11 RX ADMIN — Medication 40 MILLIEQUIVALENT(S): at 10:01

## 2017-02-11 RX ADMIN — HEPARIN SODIUM 5000 UNIT(S): 5000 INJECTION INTRAVENOUS; SUBCUTANEOUS at 23:28

## 2017-02-11 RX ADMIN — Medication 30 MILLIGRAM(S): at 06:58

## 2017-02-11 RX ADMIN — Medication 3 MILLILITER(S): at 17:56

## 2017-02-11 RX ADMIN — Medication 40 MILLIGRAM(S): at 06:15

## 2017-02-11 RX ADMIN — HEPARIN SODIUM 5000 UNIT(S): 5000 INJECTION INTRAVENOUS; SUBCUTANEOUS at 14:53

## 2017-02-11 RX ADMIN — ALBUTEROL 2.5 MILLIGRAM(S): 90 AEROSOL, METERED ORAL at 08:39

## 2017-02-11 NOTE — PHYSICAL THERAPY INITIAL EVALUATION ADULT - PRECAUTIONS/LIMITATIONS, REHAB EVAL
5L O2 NC/oxygen therapy device and L/min oxygen therapy device and L/min/isolation precautions/5L O2 NC; *DROPLET precautions isolation precautions/oxygen therapy device and L/min/5L O2 NC; *DROPLET precautions; *Language barrier (very limited English-speaking)

## 2017-02-11 NOTE — PHYSICAL THERAPY INITIAL EVALUATION ADULT - FOLLOWS COMMANDS/ANSWERS QUESTIONS, REHAB EVAL
Patient did not speak English, however followed commands 100% of time with verbal cues and visual cues./100% of the time Patient speaks very minimal English, however followed commands 100% of time with verbal cues and visual cues./100% of the time

## 2017-02-11 NOTE — PHYSICAL THERAPY INITIAL EVALUATION ADULT - CRITERIA FOR SKILLED THERAPEUTIC INTERVENTIONS
impairments found/functional limitations in following categories therapy frequency/impairments found/functional limitations in following categories/rehab potential

## 2017-02-11 NOTE — PHYSICAL THERAPY INITIAL EVALUATION ADULT - GAIT DEVIATIONS NOTED, PT EVAL
decreased francheska/decreased step length decreased francheska/decreased step length/fairly steady gait, no loss of balance

## 2017-02-11 NOTE — PHYSICAL THERAPY INITIAL EVALUATION ADULT - LIVES WITH, PROFILE
Patient lives with son, daughter in law, and grandchildren in an apartment. Elevator building/ 5 steps to enter with handrails.

## 2017-02-11 NOTE — PHYSICAL THERAPY INITIAL EVALUATION ADULT - ADDITIONAL COMMENTS
Social Hx/PLOF obtained from patient's daughter in law (Alma), reached over the phone at [phone]. As per patient's daughter in law, patient was independent with household ambulation prior to admission and does not report a history of falls. Patient was independent with ADLs and had assistance with IADLs from patient's son and daughter in law. Patient's family is home with her 24/7, however expressed interest in hiring a nurse or home health aid for assistance upon patient's D/C from [facility]. As per patient's daughter in law, patient did not leave the apartment except for appointments. Patient does not own any assistive devices at home at this time.

## 2017-02-11 NOTE — PHYSICAL THERAPY INITIAL EVALUATION ADULT - IMPAIRMENTS FOUND, PT EVAL
aerobic capacity/endurance/gait, locomotion, and balance/ventilation and respiration/gas exchange/muscle strength

## 2017-02-11 NOTE — PHYSICAL THERAPY INITIAL EVALUATION ADULT - PERTINENT HX OF CURRENT PROBLEM, REHAB EVAL
82F recently immigrated from [facility] 11/2016 PMH hyperthyroidism, 25y h/o asthma (recent ER visit at [facility] for exacerbation w/ +flu received albuterol/prednisone/tamiflu, d/c-ed on theophylline; no hx of intubations). Pt was unable to fill her meds 2/2 insurance issues and now presents w/ worsening dyspnea, cough, and inability to minimally ambulate w/o shortness of breath. Refer to H&P on sunrise for remaining.

## 2017-02-11 NOTE — PHYSICAL THERAPY INITIAL EVALUATION ADULT - PLANNED THERAPY INTERVENTIONS, PT EVAL
bed mobility training/strengthening/transfer training/balance training/gait training/stair negotiation (5 steps to enter building)

## 2017-02-11 NOTE — PROGRESS NOTE ADULT - SUBJECTIVE AND OBJECTIVE BOX
OVERNIGHT: No overnight events.  SUBJECTIVE: Patient seen and examined at bedside.     ROS:  CV: Denies chest pain  Resp: Endorses  SOB  GI: Denies abdominal pain, constipation, diarrhea, nausea, vomiting  : Denies dysuria  ID: Denies fevers, chills  MSK: Denies joint pain     OBJECTIVE:    VITAL SIGNS:  ICU Vital Signs Last 24 Hrs  T(C): 36.6, Max: 37.1 (02-10 @ 18:00)  T(F): 97.8, Max: 98.8 (02-10 @ 18:00)  HR: 64 (62 - 98)  BP: 96/39 (96/39 - 137/70)  BP(mean): 59 (59 - 107)  ABP: --  ABP(mean): --  RR: 42 (15 - 42)  SpO2: 99% (95% - 100%)        CAPILLARY BLOOD GLUCOSE  114 (11 Feb 2017 05:00)      PHYSICAL EXAM:    General: NAD, comfortable  HEENT: NCAT, PERRL, clear conjunctiva, no scleral icterus  Neck: supple, no JVD  Respiratory:  Diffuse inspiratory and expiratory wheezing  Cardiovascular: RRR,  Loud S1S2, no M/R/G  Abdomen: soft, NT/ND, bowel sounds in all four quadrants, no palpable masses  Extremities: WWP, no clubbing, cyanosis, or edema  Neuro: Normal strength.   MSK: No erythema     MEDICATIONS:  MEDICATIONS  (STANDING):  methimazole 10milliGRAM(s) Oral every 8 hours  ALBUTerol/ipratropium for Nebulization 3milliLiter(s) Nebulizer every 4 hours  insulin lispro (HumaLOG) corrective regimen sliding scale  SubCutaneous Before meals and at bedtime  fluticasone / salmeterol 250-50 MICROgram(s) Diskus 1Dose(s) Inhalation two times a day  oseltamivir 30milliGRAM(s) Oral every 12 hours  predniSONE   Tablet 40milliGRAM(s) Oral daily  heparin  Injectable 5000Unit(s) SubCutaneous every 8 hours  ALBUTerol    0.083% 2.5milliGRAM(s) Nebulizer once    MEDICATIONS  (PRN):      ALLERGIES:  Allergies    No Known Allergies    Intolerances        LABS:                        12.9   9.1   )-----------( 228      ( 10 Feb 2017 06:00 )             39.4     10 Feb 2017 06:00    141    |  107    |  14     ----------------------------<  270    3.6     |  22     |  0.66     Ca    8.0        10 Feb 2017 06:00  Phos  2.6       10 Feb 2017 06:00  Mg     2.7       10 Feb 2017 06:00    TPro  6.7    /  Alb  3.1    /  TBili  0.3    /  DBili  x      /  AST  24     /  ALT  23     /  AlkPhos  78     09 Feb 2017 23:39    PT/INR - ( 09 Feb 2017 23:39 )   PT: 11.0 sec;   INR: 0.99          PTT - ( 09 Feb 2017 23:39 )  PTT:29.2 sec      RADIOLOGY & ADDITIONAL TESTS: Reviewed.          Pulm  #Asthma exacerbation. Admitted to MICU on 2/10. Pt with asthma x 25 yrs, no intubations, recent (Feb 5-7) admission to [facility] for asthma exacerbation 2/2 influenza, here with worsening respiratory status likely due to inability to complete fill medications 2/2 underinsurance. Per patient's son (an anesthesiologist in [employer] [facility] [employer] now living in [place]yrs), he was giving her albuterol and nasonex at home, was unable to procure other medications. Pt placed on bipap in ED but was removing to go to restroom, resulting in worsening respiratory status, tachypnea. Respiratory status continued to improve on bipap. Pt now on NC satting well.   -c/w solumedrol 40 q8, Advair   -tamiflu  -duonebs 3ml q4h  -f/u IGE serum quant    Endo  #Hyperthyroidism  -c/w methimazole 10mg PO q8h  #Steroid use  -c/w ISS    FEN: No IVF, replete lytes PRN, Consistent Carb w/ evening snack  PPX: HSQ  LINES: Peripheral   Walker: None  Dispo: MICU  Code: Full OVERNIGHT: No overnight events.  SUBJECTIVE: Patient seen and examined at bedside.     ROS:  CV: Denies chest pain  Resp: Endorses  SOB  GI: Denies abdominal pain, constipation, diarrhea, nausea, vomiting  : Denies dysuria  ID: Denies fevers, chills  MSK: Denies joint pain     OBJECTIVE:    VITAL SIGNS:  ICU Vital Signs Last 24 Hrs  T(C): 36.6, Max: 37.1 (02-10 @ 18:00)  T(F): 97.8, Max: 98.8 (02-10 @ 18:00)  HR: 64 (62 - 98)  BP: 96/39 (96/39 - 137/70)  BP(mean): 59 (59 - 107)  ABP: --  ABP(mean): --  RR: 42 (15 - 42)  SpO2: 99% (95% - 100%)        CAPILLARY BLOOD GLUCOSE  114 (11 Feb 2017 05:00)      PHYSICAL EXAM:    General: NAD, comfortable  HEENT: NCAT, PERRL, clear conjunctiva, no scleral icterus  Neck: supple, no JVD  Respiratory:  Diffuse inspiratory and expiratory wheezing  Cardiovascular: RRR,  Loud S1S2, no M/R/G  Abdomen: soft, NT/ND, bowel sounds in all four quadrants, no palpable masses  Extremities: WWP, no clubbing, cyanosis, or edema  Neuro: Normal strength.   MSK: No erythema     MEDICATIONS:  MEDICATIONS  (STANDING):  methimazole 10milliGRAM(s) Oral every 8 hours  ALBUTerol/ipratropium for Nebulization 3milliLiter(s) Nebulizer every 4 hours  insulin lispro (HumaLOG) corrective regimen sliding scale  SubCutaneous Before meals and at bedtime  fluticasone / salmeterol 250-50 MICROgram(s) Diskus 1Dose(s) Inhalation two times a day  oseltamivir 30milliGRAM(s) Oral every 12 hours  predniSONE   Tablet 40milliGRAM(s) Oral daily  heparin  Injectable 5000Unit(s) SubCutaneous every 8 hours  ALBUTerol    0.083% 2.5milliGRAM(s) Nebulizer once    MEDICATIONS  (PRN):      ALLERGIES:  Allergies    No Known Allergies    Intolerances        LABS:                        12.9   9.1   )-----------( 228      ( 10 Feb 2017 06:00 )             39.4     10 Feb 2017 06:00    141    |  107    |  14     ----------------------------<  270    3.6     |  22     |  0.66     Ca    8.0        10 Feb 2017 06:00  Phos  2.6       10 Feb 2017 06:00  Mg     2.7       10 Feb 2017 06:00    TPro  6.7    /  Alb  3.1    /  TBili  0.3    /  DBili  x      /  AST  24     /  ALT  23     /  AlkPhos  78     09 Feb 2017 23:39    PT/INR - ( 09 Feb 2017 23:39 )   PT: 11.0 sec;   INR: 0.99          PTT - ( 09 Feb 2017 23:39 )  PTT:29.2 sec      RADIOLOGY & ADDITIONAL TESTS: Reviewed.          Pulm  #Asthma exacerbation. Admitted to MICU on 2/10. Pt with asthma x 25 yrs, no intubations, recent (Feb 5-7) admission to [facility] for asthma exacerbation 2/2 influenza, here with worsening respiratory status likely due to inability to complete fill medications 2/2 underinsurance. Per patient's son (an anesthesiologist in [facility] [employer] now living in [place]), he was giving her albuterol and nasonex at home, was unable to procure other medications. Pt placed on bipap in ED but was removing to go to restroom, resulting in worsening respiratory status, tachypnea. Respiratory status continued to improve on bipap. Pt now on 4L NC satting well.   -c/w prednisone 60 daily, Advair   -tamiflu    -duonebs 3ml q4h  -f/u IGE serum quant    Endo  #Hyperthyroidism  -c/w methimazole 10mg PO q8h  #Steroid use  -c/w ISS    FEN: No IVF, replete lytes PRN, Consistent Carb w/ evening snack  PPX: HSQ  LINES: Peripheral   Walker: None  Dispo: MICU  Code: Full OVERNIGHT: No overnight events.  SUBJECTIVE: Patient seen and examined at bedside.     ROS:  CV: Denies chest pain  Resp: Endorses  SOB  GI: Denies abdominal pain, constipation, diarrhea, nausea, vomiting  : Denies dysuria  ID: Denies fevers, chills  MSK: Denies joint pain     OBJECTIVE:    VITAL SIGNS:  ICU Vital Signs Last 24 Hrs  T(C): 36.6, Max: 37.1 (02-10 @ 18:00)  T(F): 97.8, Max: 98.8 (02-10 @ 18:00)  HR: 64 (62 - 98)  BP: 96/39 (96/39 - 137/70)  BP(mean): 59 (59 - 107)  ABP: --  ABP(mean): --  RR: 42 (15 - 42)  SpO2: 99% (95% - 100%)        CAPILLARY BLOOD GLUCOSE  114 (11 Feb 2017 05:00)      PHYSICAL EXAM:    General: NAD, comfortable  HEENT: NCAT, PERRL, clear conjunctiva, no scleral icterus; tongue moist, no thrush  Neck: supple, no JVD  Respiratory:  Diffuse inspiratory and expiratory wheezing  Cardiovascular: RRR,  Loud S1S2, no M/R/G  Abdomen: soft, NT/ND, bowel sounds in all four quadrants, no palpable masses  Extremities: WWP, no clubbing, cyanosis, or edema  Neuro: Normal strength.   Vasc: 2+ carotid, femoral and DP pulses  MSK: No erythema or joint swelling    MEDICATIONS:  MEDICATIONS  (STANDING):  methimazole 10milliGRAM(s) Oral every 8 hours  ALBUTerol/ipratropium for Nebulization 3milliLiter(s) Nebulizer every 4 hours  insulin lispro (HumaLOG) corrective regimen sliding scale  SubCutaneous Before meals and at bedtime  fluticasone / salmeterol 250-50 MICROgram(s) Diskus 1Dose(s) Inhalation two times a day  oseltamivir 30milliGRAM(s) Oral every 12 hours  predniSONE   Tablet 40milliGRAM(s) Oral daily  heparin  Injectable 5000Unit(s) SubCutaneous every 8 hours  ALBUTerol    0.083% 2.5milliGRAM(s) Nebulizer once    MEDICATIONS  (PRN):      ALLERGIES:  Allergies    No Known Allergies    Intolerances        LABS:                        12.9   9.1   )-----------( 228      ( 10 Feb 2017 06:00 )             39.4     10 Feb 2017 06:00    141    |  107    |  14     ----------------------------<  270    3.6     |  22     |  0.66     Ca    8.0        10 Feb 2017 06:00  Phos  2.6       10 Feb 2017 06:00  Mg     2.7       10 Feb 2017 06:00    TPro  6.7    /  Alb  3.1    /  TBili  0.3    /  DBili  x      /  AST  24     /  ALT  23     /  AlkPhos  78     09 Feb 2017 23:39    PT/INR - ( 09 Feb 2017 23:39 )   PT: 11.0 sec;   INR: 0.99          PTT - ( 09 Feb 2017 23:39 )  PTT:29.2 sec      RADIOLOGY & ADDITIONAL TESTS: Reviewed.    ASSESSMENT- asthma exacerbation possibly because of influenza          Pulm  #Asthma exacerbation. Admitted to MICU on 2/10. Pt with asthma x 25 yrs, no intubations, recent (Feb 5-7) admission to [facility] for asthma exacerbation 2/2 influenza, here with worsening respiratory status likely due to inability to complete fill medications 2/2 underinsurance. Per patient's son (an anesthesiologist in [facility] now [place] in [place]), he was giving her albuterol and nasonex at home, was unable to procure other medications. Pt placed on bipap in ED but was removing to go to restroom, resulting in worsening respiratory status, tachypnea. Respiratory status continued to improve on bipap. Pt now on 4L NC satting well.   -c/w prednisone 60 daily, Advair   -tamiflu    -duonebs 3ml q4h  -f/u IGE serum quant    Endo  #Hyperthyroidism  -c/w methimazole 10mg PO q8h  #Steroid use  -c/w ISS    FEN: No IVF, replete lytes PRN, Consistent Carb w/ evening snack  PPX: HSQ  LINES: Peripheral   Walker: None  Dispo: MICU  Code: Full

## 2017-02-12 LAB
ANION GAP SERPL CALC-SCNC: 7 MMOL/L — LOW (ref 9–16)
BASE EXCESS BLDA CALC-SCNC: -0.5 MMOL/L — SIGNIFICANT CHANGE UP (ref -2–3)
BASOPHILS NFR BLD AUTO: 0.3 % — SIGNIFICANT CHANGE UP (ref 0–2)
BUN SERPL-MCNC: 17 MG/DL — SIGNIFICANT CHANGE UP (ref 7–23)
CALCIUM SERPL-MCNC: 8.9 MG/DL — SIGNIFICANT CHANGE UP (ref 8.5–10.5)
CHLORIDE SERPL-SCNC: 109 MMOL/L — HIGH (ref 96–108)
CO2 SERPL-SCNC: 25 MMOL/L — SIGNIFICANT CHANGE UP (ref 22–31)
CREAT SERPL-MCNC: 0.64 MG/DL — SIGNIFICANT CHANGE UP (ref 0.5–1.3)
EOSINOPHIL NFR BLD AUTO: 2 % — SIGNIFICANT CHANGE UP (ref 0–6)
GLUCOSE SERPL-MCNC: 97 MG/DL — SIGNIFICANT CHANGE UP (ref 70–99)
HCO3 BLDA-SCNC: 22 MMOL/L — SIGNIFICANT CHANGE UP (ref 21–28)
HCT VFR BLD CALC: 40.4 % — SIGNIFICANT CHANGE UP (ref 34.5–45)
HGB BLD-MCNC: 13.3 G/DL — SIGNIFICANT CHANGE UP (ref 11.5–15.5)
LYMPHOCYTES # BLD AUTO: 27.3 % — SIGNIFICANT CHANGE UP (ref 13–44)
MAGNESIUM SERPL-MCNC: 2.6 MG/DL — HIGH (ref 1.6–2.4)
MCHC RBC-ENTMCNC: 29.1 PG — SIGNIFICANT CHANGE UP (ref 27–34)
MCHC RBC-ENTMCNC: 32.9 G/DL — SIGNIFICANT CHANGE UP (ref 32–36)
MCV RBC AUTO: 88.4 FL — SIGNIFICANT CHANGE UP (ref 80–100)
MONOCYTES NFR BLD AUTO: 8 % — SIGNIFICANT CHANGE UP (ref 2–14)
NEUTROPHILS NFR BLD AUTO: 62.4 % — SIGNIFICANT CHANGE UP (ref 43–77)
PCO2 BLDA: 32 MMHG — SIGNIFICANT CHANGE UP (ref 32–45)
PH BLDA: 7.46 — HIGH (ref 7.35–7.45)
PLATELET # BLD AUTO: 252 K/UL — SIGNIFICANT CHANGE UP (ref 150–400)
PO2 BLDA: 127 MMHG — HIGH (ref 83–108)
POTASSIUM SERPL-MCNC: 4.1 MMOL/L — SIGNIFICANT CHANGE UP (ref 3.5–5.3)
POTASSIUM SERPL-SCNC: 4.1 MMOL/L — SIGNIFICANT CHANGE UP (ref 3.5–5.3)
RBC # BLD: 4.57 M/UL — SIGNIFICANT CHANGE UP (ref 3.8–5.2)
RBC # FLD: 13.2 % — SIGNIFICANT CHANGE UP (ref 10.3–16.9)
SAO2 % BLDA: 98 % — SIGNIFICANT CHANGE UP (ref 95–100)
SODIUM SERPL-SCNC: 141 MMOL/L — SIGNIFICANT CHANGE UP (ref 135–145)
WBC # BLD: 22.8 K/UL — HIGH (ref 3.8–10.5)
WBC # FLD AUTO: 22.8 K/UL — HIGH (ref 3.8–10.5)

## 2017-02-12 PROCEDURE — 71250 CT THORAX DX C-: CPT | Mod: 26

## 2017-02-12 PROCEDURE — 99233 SBSQ HOSP IP/OBS HIGH 50: CPT | Mod: GC

## 2017-02-12 PROCEDURE — 93010 ELECTROCARDIOGRAM REPORT: CPT

## 2017-02-12 RX ADMIN — Medication 0.5 MILLIGRAM(S): at 13:46

## 2017-02-12 RX ADMIN — Medication 3 MILLILITER(S): at 06:13

## 2017-02-12 RX ADMIN — HEPARIN SODIUM 5000 UNIT(S): 5000 INJECTION INTRAVENOUS; SUBCUTANEOUS at 13:49

## 2017-02-12 RX ADMIN — FLUTICASONE PROPIONATE AND SALMETEROL 1 DOSE(S): 50; 250 POWDER ORAL; RESPIRATORY (INHALATION) at 20:56

## 2017-02-12 RX ADMIN — Medication 3 MILLILITER(S): at 09:13

## 2017-02-12 RX ADMIN — HEPARIN SODIUM 5000 UNIT(S): 5000 INJECTION INTRAVENOUS; SUBCUTANEOUS at 23:17

## 2017-02-12 RX ADMIN — Medication 4: at 11:17

## 2017-02-12 RX ADMIN — Medication 3 MILLILITER(S): at 01:23

## 2017-02-12 RX ADMIN — Medication 3 MILLILITER(S): at 22:17

## 2017-02-12 RX ADMIN — Medication 30 MILLIGRAM(S): at 05:42

## 2017-02-12 RX ADMIN — Medication 60 MILLIGRAM(S): at 05:43

## 2017-02-12 RX ADMIN — FLUTICASONE PROPIONATE AND SALMETEROL 1 DOSE(S): 50; 250 POWDER ORAL; RESPIRATORY (INHALATION) at 07:00

## 2017-02-12 RX ADMIN — Medication 30 MILLIGRAM(S): at 18:11

## 2017-02-12 RX ADMIN — Medication 3 MILLILITER(S): at 17:20

## 2017-02-12 RX ADMIN — HEPARIN SODIUM 5000 UNIT(S): 5000 INJECTION INTRAVENOUS; SUBCUTANEOUS at 05:43

## 2017-02-12 RX ADMIN — Medication 2: at 16:55

## 2017-02-12 RX ADMIN — Medication 0.5 MILLIGRAM(S): at 23:17

## 2017-02-12 RX ADMIN — Medication 3 MILLILITER(S): at 13:30

## 2017-02-12 NOTE — DIETITIAN INITIAL EVALUATION ADULT. - PROBLEM SELECTOR PLAN 1
severe exacerbation in setting of noncompliance with medications and recent influenza. Currently with increased work of breathing, continuously removing her BiPAP. Elevated WBC likely secondary to recent steroid use. Patient remains afebrile without localizing symptoms of bacterial infection.   - informed patient via Persian  service regarding risks of refusing BiPAP, including increased respiratory distress and the need for intubation. Patient verbalizes understanding and agrees to wear BiPAP.    - Obtaining ICU consult for need for close monitoring on telemetry and possible need for intubation for increased work of breathing.    - Solu-medrol 40mg Q8H, inhaled corticosteroid, Duonebs Q2-4H, Con't BiPAP   - PPI, VICTOIRA while on steroids

## 2017-02-12 NOTE — DIETITIAN INITIAL EVALUATION ADULT. - PROBLEM SELECTOR PLAN 2
recently diagnosed with influenza at [facility]. Completed 3 days of Tamiflu prior to discharge on 02/07.    - Continue Tamiflu 75mg BID   - Droplet precautions   - Con't supportive therapy

## 2017-02-12 NOTE — DIETITIAN INITIAL EVALUATION ADULT. - PROBLEM SELECTOR PLAN 3
recent diagnosis of hyperthyroidism at [facility], discharged on Methimazole, which the patient has not been able to fill.    - Check thyroid panel, will obtain collateral from [facility]

## 2017-02-12 NOTE — PROGRESS NOTE ADULT - SUBJECTIVE AND OBJECTIVE BOX
INTERVAL HPI/OVERNIGHT EVENTS:    OVERNIGHT: No overnight events.  SUBJECTIVE: Patient seen and examined at bedside.     ROS:  CV: Denies chest pain  Resp: Denies SOB  GI: Denies abdominal pain, constipation, diarrhea, nausea, vomiting  : Denies dysuria  ID: Denies fevers, chills  MSK: Denies joint pain     OBJECTIVE:    VITAL SIGNS:  ICU Vital Signs Last 24 Hrs  T(C): 36.3, Max: 36.7 (02-11 @ 09:13)  T(F): 97.4, Max: 98 (02-11 @ 09:13)  HR: 90 (62 - 104)  BP: 127/51 (93/43 - 140/64)  BP(mean): 77 (54 - 95)  ABP: --  ABP(mean): --  RR: 34 (20 - 47)  SpO2: 100% (97% - 100%)        CAPILLARY BLOOD GLUCOSE  121 (12 Feb 2017 06:50)      PHYSICAL EXAM:    General: NAD, comfortable  HEENT: NCAT, PERRL, clear conjunctiva, no scleral icterus; tongue moist, no thrush  Neck: supple, no JVD  Respiratory:  Diffuse inspiratory and expiratory wheezing  Cardiovascular: RRR,  Loud S1S2, no M/R/G  Abdomen: soft, NT/ND, bowel sounds in all four quadrants, no palpable masses  Extremities: WWP, no clubbing, cyanosis, or edema  Neuro: Normal strength.   Vasc: 2+ carotid, femoral and DP pulses  MSK: No erythema or joint swelling    MEDICATIONS:  MEDICATIONS  (STANDING):  methimazole 10milliGRAM(s) Oral every 8 hours  ALBUTerol/ipratropium for Nebulization 3milliLiter(s) Nebulizer every 4 hours  insulin lispro (HumaLOG) corrective regimen sliding scale  SubCutaneous Before meals and at bedtime  fluticasone / salmeterol 250-50 MICROgram(s) Diskus 1Dose(s) Inhalation two times a day  oseltamivir 30milliGRAM(s) Oral every 12 hours  heparin  Injectable 5000Unit(s) SubCutaneous every 8 hours  predniSONE   Tablet 60milliGRAM(s) Oral daily    MEDICATIONS  (PRN):      ALLERGIES:  Allergies    No Known Allergies    Intolerances        LABS:                        13.3   22.8  )-----------( 252      ( 12 Feb 2017 06:02 )             40.4     12 Feb 2017 06:02    141    |  109    |  17     ----------------------------<  97     4.1     |  25     |  0.64     Ca    8.9        12 Feb 2017 06:02  Phos  2.6       11 Feb 2017 07:49  Mg     2.6       12 Feb 2017 06:02        RADIOLOGY & ADDITIONAL TESTS: Reviewed.    ASSESSMENT- asthma exacerbation possibly because of influenza      Pulm  #Asthma exacerbation. Admitted to MICU on 2/10. Pt with asthma x 25 yrs, no intubations, recent (Feb 5-7) admission to [facility] for asthma exacerbation 2/2 influenza, here with worsening respiratory status likely due to inability to complete fill medications 2/2 underinsurance. Per patient's son (an anesthesiologist in [facility] now living in [place]), he was giving her albuterol and nasonex at home, was unable to procure other medications. Pt placed on bipap in ED but was removing to go to restroom, resulting in worsening respiratory status, tachypnea. Respiratory status continued to improve on bipap. Pt now on 4L NC satting well.   -c/w prednisone 60 daily, Advair   -tamiflu    -duonebs 3ml q4h  -f/u IGE serum quant    Endo  #Hyperthyroidism  -c/w methimazole 10mg PO q8h  #Steroid use  -c/w ISS    FEN: No IVF, replete lytes PRN, Consistent Carb w/ evening snack  PPX: HSQ  LINES: Peripheral   Walker: None  Dispo: MICU  Code: Full INTERVAL HPI/OVERNIGHT EVENTS:    OVERNIGHT: No overnight events.  SUBJECTIVE: Patient seen and examined at bedside.     ROS:  CV: Denies chest pain  Resp: Denies SOB  GI: Denies abdominal pain, constipation, diarrhea, nausea, vomiting  : Denies dysuria  ID: Denies fevers, chills  MSK: Denies joint pain     OBJECTIVE:    VITAL SIGNS:  ICU Vital Signs Last 24 Hrs  T(C): 36.3, Max: 36.7 (02-11 @ 09:13)  T(F): 97.4, Max: 98 (02-11 @ 09:13)  HR: 90 (62 - 104)  BP: 127/51 (93/43 - 140/64)  BP(mean): 77 (54 - 95)  ABP: --  ABP(mean): --  RR: 34 (20 - 47)  SpO2: 100% (97% - 100%)        CAPILLARY BLOOD GLUCOSE  121 (12 Feb 2017 06:50)      PHYSICAL EXAM:    General: NAD, comfortable  HEENT: NCAT, PERRL, clear conjunctiva, no scleral icterus; tongue moist, no thrush  Neck: supple, no JVD  Respiratory:  Diffuse inspiratory and expiratory wheezing  Cardiovascular: RRR,  Loud S1S2, no M/R/G  Abdomen: soft, NT/ND, bowel sounds in all four quadrants, no palpable masses  Extremities: WWP, no clubbing, cyanosis, or edema  Neuro: Normal strength.   Vasc: 2+ carotid, femoral and DP pulses  MSK: No erythema or joint swelling    MEDICATIONS:  MEDICATIONS  (STANDING):  methimazole 10milliGRAM(s) Oral every 8 hours  ALBUTerol/ipratropium for Nebulization 3milliLiter(s) Nebulizer every 4 hours  insulin lispro (HumaLOG) corrective regimen sliding scale  SubCutaneous Before meals and at bedtime  fluticasone / salmeterol 250-50 MICROgram(s) Diskus 1Dose(s) Inhalation two times a day  oseltamivir 30milliGRAM(s) Oral every 12 hours  heparin  Injectable 5000Unit(s) SubCutaneous every 8 hours  predniSONE   Tablet 60milliGRAM(s) Oral daily    MEDICATIONS  (PRN):      ALLERGIES:  Allergies    No Known Allergies    Intolerances        LABS:                        13.3   22.8  )-----------( 252      ( 12 Feb 2017 06:02 )             40.4     12 Feb 2017 06:02    141    |  109    |  17     ----------------------------<  97     4.1     |  25     |  0.64     Ca    8.9        12 Feb 2017 06:02  Phos  2.6       11 Feb 2017 07:49  Mg     2.6       12 Feb 2017 06:02        RADIOLOGY & ADDITIONAL TESTS: Reviewed.    ASSESSMENT- asthma exacerbation possibly because of influenza      Pulm  #Asthma exacerbation. Admitted to MICU on 2/10. Pt with asthma x 25 yrs, no intubations, recent (Feb 5-7) admission to [facility] for asthma exacerbation 2/2 influenza, here with worsening respiratory status likely due to inability to complete fill medications 2/2 underinsurance. Per patient's son (an anesthesiologist in [facility] now living in [place]), he was giving her albuterol and nasonex at home, was unable to procure other medications. Pt placed on bipap in ED but was removing to go to restroom, resulting in worsening respiratory status, tachypnea. Respiratory status continued to improve on bipap. Was on 4L NC satting well. Now on Bipap after tachypneic on 2/12    -c/w prednisone 60 daily, Advair   -tamiflu    -duonebs 3ml q4h  -added ativan .5  q12 with suspicion of possible anxiety component   -CT chest/ ECHO given exacerbation refractory    Endo  #Hyperthyroidism  -c/w methimazole 10mg PO q8h  #Steroid use  -c/w ISS    FEN: No IVF, replete lytes PRN, Consistent Carb w/ evening snack  PPX: HSQ  LINES: Peripheral   Walker: None  Dispo: MICU  Code: Full INTERVAL HPI/OVERNIGHT EVENTS:    OVERNIGHT: No overnight events.  SUBJECTIVE: Patient seen and examined at bedside.     ROS:  CV: Denies chest pain  Resp: Denies SOB  GI: Denies abdominal pain, constipation, diarrhea, nausea, vomiting  : Denies dysuria  ID: Denies fevers, chills  MSK: Denies joint pain     OBJECTIVE:    VITAL SIGNS:  ICU Vital Signs Last 24 Hrs  T(C): 36.3, Max: 36.7 (02-11 @ 09:13)  T(F): 97.4, Max: 98 (02-11 @ 09:13)  HR: 90 (62 - 104)  BP: 127/51 (93/43 - 140/64)  BP(mean): 77 (54 - 95)  ABP: --  ABP(mean): --  RR: 34 (20 - 47)  SpO2: 100% (97% - 100%)        CAPILLARY BLOOD GLUCOSE  121 (12 Feb 2017 06:50)      PHYSICAL EXAM:    General: NAD, comfortable  HEENT: NCAT, PERRL, clear conjunctiva, no scleral icterus; tongue moist, no thrush  Neck: supple, no JVD  Respiratory:  Diffuse expiratory wheezing  Cardiovascular: RRR,  Loud S1S2, no M/R/G  Abdomen: soft, NT/ND, bowel sounds in all four quadrants, no palpable masses  Extremities: WWP, no clubbing, cyanosis, or edema  Neuro: Normal strength.   Vasc: 2+ carotid, femoral and DP pulses  MSK: No erythema or joint swelling    MEDICATIONS:  MEDICATIONS  (STANDING):  methimazole 10milliGRAM(s) Oral every 8 hours  ALBUTerol/ipratropium for Nebulization 3milliLiter(s) Nebulizer every 4 hours  insulin lispro (HumaLOG) corrective regimen sliding scale  SubCutaneous Before meals and at bedtime  fluticasone / salmeterol 250-50 MICROgram(s) Diskus 1Dose(s) Inhalation two times a day  oseltamivir 30milliGRAM(s) Oral every 12 hours  heparin  Injectable 5000Unit(s) SubCutaneous every 8 hours  predniSONE   Tablet 60milliGRAM(s) Oral daily    MEDICATIONS  (PRN):      ALLERGIES:  Allergies    No Known Allergies    Intolerances        LABS:                        13.3   22.8  )-----------( 252      ( 12 Feb 2017 06:02 )             40.4     12 Feb 2017 06:02    141    |  109    |  17     ----------------------------<  97     4.1     |  25     |  0.64     Ca    8.9        12 Feb 2017 06:02  Phos  2.6       11 Feb 2017 07:49  Mg     2.6       12 Feb 2017 06:02        RADIOLOGY & ADDITIONAL TESTS: Reviewed.    ASSESSMENT- asthma exacerbation possibly because of influenza      Pulm  #Asthma exacerbation. Admitted to MICU on 2/10. Pt with asthma x 25 yrs, no intubations, recent (Feb 5-7) admission to [facility] for asthma exacerbation 2/2 influenza, here with worsening respiratory status likely due to inability to complete fill medications 2/2 underinsurance. Per patient's son (an anesthesiologist in [employer] [facility] now living [place]), he was giving her albuterol and nasonex at home, was unable to procure other medications. Pt placed on bipap in ED but was removing to go to restroom, resulting in worsening respiratory status, tachypnea. Respiratory status continued to improve on bipap. Was on 4L NC satting well. Now on Bipap after tachypneic on 2/12    -c/w prednisone 60 daily, Advair   -tamiflu    -duonebs 3ml q4h  -added ativan .5  q12 with suspicion of possible anxiety component   -CT chest/ ECHO given exacerbation refractory    Endo  #Hyperthyroidism  -c/w methimazole 10mg PO q8h  #Steroid use  -c/w ISS    FEN: No IVF, replete lytes PRN, Consistent Carb w/ evening snack  PPX: HSQ  LINES: Peripheral   Walker: None  Dispo: MICU  Code: Full

## 2017-02-12 NOTE — DIETITIAN INITIAL EVALUATION ADULT. - ENERGY NEEDS
125% IBW; BMI 25. IBW used due to pt > 120% of IBW, increased nutrient needs secondary to respiratory distress.

## 2017-02-13 LAB
ANION GAP SERPL CALC-SCNC: 9 MMOL/L — SIGNIFICANT CHANGE UP (ref 9–16)
BUN SERPL-MCNC: 21 MG/DL — SIGNIFICANT CHANGE UP (ref 7–23)
CALCIUM SERPL-MCNC: 8.1 MG/DL — LOW (ref 8.5–10.5)
CHLORIDE SERPL-SCNC: 108 MMOL/L — SIGNIFICANT CHANGE UP (ref 96–108)
CO2 SERPL-SCNC: 27 MMOL/L — SIGNIFICANT CHANGE UP (ref 22–31)
CREAT SERPL-MCNC: 0.66 MG/DL — SIGNIFICANT CHANGE UP (ref 0.5–1.3)
GLUCOSE SERPL-MCNC: 90 MG/DL — SIGNIFICANT CHANGE UP (ref 70–99)
HCT VFR BLD CALC: 37.9 % — SIGNIFICANT CHANGE UP (ref 34.5–45)
HGB BLD-MCNC: 12.5 G/DL — SIGNIFICANT CHANGE UP (ref 11.5–15.5)
MAGNESIUM SERPL-MCNC: 2.2 MG/DL — SIGNIFICANT CHANGE UP (ref 1.6–2.4)
MCHC RBC-ENTMCNC: 29 PG — SIGNIFICANT CHANGE UP (ref 27–34)
MCHC RBC-ENTMCNC: 33 G/DL — SIGNIFICANT CHANGE UP (ref 32–36)
MCV RBC AUTO: 87.9 FL — SIGNIFICANT CHANGE UP (ref 80–100)
PHOSPHATE SERPL-MCNC: 3.2 MG/DL — SIGNIFICANT CHANGE UP (ref 2.5–4.5)
PLATELET # BLD AUTO: 269 K/UL — SIGNIFICANT CHANGE UP (ref 150–400)
POTASSIUM SERPL-MCNC: 3.6 MMOL/L — SIGNIFICANT CHANGE UP (ref 3.5–5.3)
POTASSIUM SERPL-SCNC: 3.6 MMOL/L — SIGNIFICANT CHANGE UP (ref 3.5–5.3)
RBC # BLD: 4.31 M/UL — SIGNIFICANT CHANGE UP (ref 3.8–5.2)
RBC # FLD: 13.1 % — SIGNIFICANT CHANGE UP (ref 10.3–16.9)
SODIUM SERPL-SCNC: 144 MMOL/L — SIGNIFICANT CHANGE UP (ref 135–145)
WBC # BLD: 22.8 K/UL — HIGH (ref 3.8–10.5)
WBC # FLD AUTO: 22.8 K/UL — HIGH (ref 3.8–10.5)

## 2017-02-13 PROCEDURE — 99233 SBSQ HOSP IP/OBS HIGH 50: CPT | Mod: GC

## 2017-02-13 PROCEDURE — 93306 TTE W/DOPPLER COMPLETE: CPT | Mod: 26

## 2017-02-13 PROCEDURE — 99221 1ST HOSP IP/OBS SF/LOW 40: CPT

## 2017-02-13 RX ORDER — POTASSIUM CHLORIDE 20 MEQ
40 PACKET (EA) ORAL ONCE
Qty: 0 | Refills: 0 | Status: COMPLETED | OUTPATIENT
Start: 2017-02-13 | End: 2017-02-13

## 2017-02-13 RX ORDER — SODIUM CHLORIDE 9 MG/ML
5 INJECTION INTRAMUSCULAR; INTRAVENOUS; SUBCUTANEOUS ONCE
Qty: 0 | Refills: 0 | Status: DISCONTINUED | OUTPATIENT
Start: 2017-02-13 | End: 2017-02-21

## 2017-02-13 RX ADMIN — Medication 60 MILLIGRAM(S): at 07:00

## 2017-02-13 RX ADMIN — Medication 40 MILLIEQUIVALENT(S): at 07:00

## 2017-02-13 RX ADMIN — FLUTICASONE PROPIONATE AND SALMETEROL 1 DOSE(S): 50; 250 POWDER ORAL; RESPIRATORY (INHALATION) at 18:29

## 2017-02-13 RX ADMIN — Medication: at 12:00

## 2017-02-13 RX ADMIN — Medication 0.5 MILLIGRAM(S): at 13:09

## 2017-02-13 RX ADMIN — HEPARIN SODIUM 5000 UNIT(S): 5000 INJECTION INTRAVENOUS; SUBCUTANEOUS at 22:15

## 2017-02-13 RX ADMIN — Medication 3 MILLILITER(S): at 09:12

## 2017-02-13 RX ADMIN — Medication 3 MILLILITER(S): at 14:21

## 2017-02-13 RX ADMIN — HEPARIN SODIUM 5000 UNIT(S): 5000 INJECTION INTRAVENOUS; SUBCUTANEOUS at 15:06

## 2017-02-13 RX ADMIN — Medication 3 MILLILITER(S): at 02:40

## 2017-02-13 RX ADMIN — Medication 3 MILLILITER(S): at 19:08

## 2017-02-13 RX ADMIN — FLUTICASONE PROPIONATE AND SALMETEROL 1 DOSE(S): 50; 250 POWDER ORAL; RESPIRATORY (INHALATION) at 07:01

## 2017-02-13 RX ADMIN — HEPARIN SODIUM 5000 UNIT(S): 5000 INJECTION INTRAVENOUS; SUBCUTANEOUS at 07:00

## 2017-02-13 RX ADMIN — Medication 0.5 MILLIGRAM(S): at 22:15

## 2017-02-13 RX ADMIN — Medication 3 MILLILITER(S): at 22:14

## 2017-02-13 RX ADMIN — Medication 3 MILLILITER(S): at 06:10

## 2017-02-13 NOTE — CONSULT NOTE ADULT - PROBLEM SELECTOR RECOMMENDATION 9
Overall much improved with bronchodilator therapy and high dose steroids, which have been tapered down. It seems though that the recurrent intermittent episodes during which she is with increased WOB as well as significant bronchoconstriction can not be attributed to her Asthma as she is on adequate treatment.   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 PVCD less likely to be the cause of episodes as she is constantly wheezing and it would not explain the above findings on CT.  - Would check IgE levels as well as Aspergillous percipitant   - AFB for HOMA (no need for negative pressure isolation as there is no clinical suspicion for TB)  - Agree with steroids  - Agree with Advair as well as Ewelina  - Please attempt to get collateral information from [facility]

## 2017-02-13 NOTE — CONSULT NOTE ADULT - ASSESSMENT
81 yo F, PMH of Asthma with frequent exacerbations, now with acute exacerbation of asthma and intermittent recurrent dyspnea and wheezing despite treatment

## 2017-02-13 NOTE — CONSULT NOTE ADULT - SUBJECTIVE AND OBJECTIVE BOX
81 yo F, PMH of Asthma diagnosed ~15 years ago, with multiple exacerbations, never required intubation. Presenting with Asthma exacerbation, in severe respiratory distress requiring BiPAP and ICU care. Since admission improved, however with recurrent episodes of severe dyspnea and wheezing requiring BiPAP despite treatment with bronchodilators and high dose steroids. Episodes mostly during awake hours, despite tx with Ativan. 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   Of note - recent admission to [facility] earlier this month for asthma exacerbation and flu, d/c'd on Theophylline, however not filled out d/t insurance issues and was on Albuterol only.    REVIEW OF SYSTEMS:  Constitutional: No fever, weight loss or fatigue  Eyes: No eye pain, visual disturbances, or discharge  ENMT:  No difficulty hearing, tinnitus, vertigo; No sinus or throat pain  Neck: No pain, stiffness or neck swelling  Respiratory: No cough, wheezing, chills or hemoptysis  Cardiovascular: No chest pain, palpitations, dizziness or leg swelling  Gastrointestinal: No abdominal or epigastric pain. No nausea, vomiting or hematemesis; No diarrhea or constipation. No melena or hematochezia.  Genitourinary: No dysuria, frequency, hematuria or incontinence  Neurological: No headaches, memory loss, loss of strength, numbness or tremors  Skin: No itching, burning, rashes or lesions   Lymph Nodes: No enlarged glands  Endocrine: No heat or cold intolerance; No hair loss  Musculoskeletal: No joint pain or swelling; No muscle, back or extremity pain  Psychiatric: No depression, anxiety, mood swings or difficulty sleeping  Heme/Lymph: No easy bruising or bleeding gums  Allergy and Immunologic: No hives or eczema    PAST MEDICAL & SURGICAL HISTORY:  Hyperthyroidism  Asthma  Influenza  S/P hysterectomy      FAMILY HISTORY:      SOCIAL HISTORY:  Smoking Status: [ ] Current, [ ] Former, [ ] Never  Pack Years:    MEDICATIONS:  Pulmonary:  ALBUTerol/ipratropium for Nebulization 3milliLiter(s) Nebulizer every 4 hours  fluticasone / salmeterol 250-50 MICROgram(s) Diskus 1Dose(s) Inhalation two times a day  sodium chloride 3%  Inhalation 5milliLiter(s) Inhalation once    Antimicrobials:    Anticoagulants:  heparin  Injectable 5000Unit(s) SubCutaneous every 8 hours    Onc:    GI/:    Endocrine:  methimazole 10milliGRAM(s) Oral every 8 hours  insulin lispro (HumaLOG) corrective regimen sliding scale  SubCutaneous Before meals and at bedtime  predniSONE   Tablet 60milliGRAM(s) Oral daily    Cardiac:    Other Medications:  LORazepam     Tablet 0.5milliGRAM(s) Oral every 12 hours      Allergies    No Known Allergies    Intolerances        Vital Signs Last 24 Hrs  T(C): 37.1, Max: 37.1 (02-12 @ 18:39)  T(F): 98.7, Max: 98.8 (02-12 @ 18:39)  HR: 96 (80 - 114)  BP: 101/56 (98/47 - 119/54)  BP(mean): 70 (56 - 80)  RR: 29 (23 - 38)  SpO2: 96% (95% - 100%)        PHYSICAL EXAM:    General: Well developed; well nourished; in no acute distress  Eyes: PERRL, EOM intact; conjunctiva and sclera clear  Head: Normocephalic; atraumatic  ENMT: No nasal discharge; airway clear  Neck: Supple; non tender; no masses  Respiratory: Clear to auscultation bilaterally without wheezing, rhonchi or rales  Cardiovascular: Regular rate and rhythm. S1 and S2 Normal; No murmurs, gallops or rubs  Gastrointestinal: Soft non-tender non-distended; Normal bowel sounds; No hepatosplenomegaly  Genitourinary: No costovertebral angle tenderness  Extremities: Normal range of motion, No clubbing, cyanosis or edema  Vascular: Peripheral pulses palpable 2+ bilaterally  Neurological: Alert and oriented x3  Skin: Warm and dry. No obvious rash  Lymph Nodes: No acute cervical or supraclavicular adenopathy  Musculoskeletal: Normal gait, tone, without deformities  Psychiatric: Cooperative and appropriate mood    LABS:  ABG - ( 12 Feb 2017 09:24 )  pH: 7.46  /  pCO2: 32    /  pO2: 127   / HCO3: 22    / Base Excess: -0.5  /  SaO2: 98                  CBC Full  -  ( 13 Feb 2017 06:03 )  WBC Count : 22.8 K/uL  Hemoglobin : 12.5 g/dL  Hematocrit : 37.9 %  Platelet Count - Automated : 269 K/uL  Mean Cell Volume : 87.9 fL  Mean Cell Hemoglobin : 29.0 pg  Mean Cell Hemoglobin Concentration : 33.0 g/dL  Auto Neutrophil # : x  Auto Lymphocyte # : x  Auto Monocyte # : x  Auto Eosinophil # : x  Auto Basophil # : x  Auto Neutrophil % : x  Auto Lymphocyte % : x  Auto Monocyte % : x  Auto Eosinophil % : x  Auto Basophil % : x    13 Feb 2017 06:02    144    |  108    |  21     ----------------------------<  90     3.6     |  27     |  0.66     Ca    8.1        13 Feb 2017 06:02  Phos  3.2       13 Feb 2017 06:02  Mg     2.2       13 Feb 2017 06:02                        RADIOLOGY & ADDITIONAL STUDIES (The following images were personally reviewed): 81 yo F, recent immigrant from [place], PMH of Asthma diagnosed ~20 years ago, with multiple exacerbations, never required intubation. Presenting with Asthma exacerbation, in severe respiratory distress requiring BiPAP and ICU care. Since admission improved, however with recurrent episodes of severe dyspnea and wheezing requiring BiPAP despite treatment with bronchodilators and high dose steroids. Episodes mostly during awake hours, despite tx with Ativan. 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   Of note - recent admission to [facility] earlier this month for asthma exacerbation and flu, d/c'd on Theophylline, however not filled out d/t insurance issues and was on Albuterol only.    REVIEW OF SYSTEMS: as per HPI  PAST MEDICAL & SURGICAL HISTORY:  Hyperthyroidism  Asthma  Influenza  S/P hysterectomy      FAMILY HISTORY:      SOCIAL HISTORY:  Smoking Status: [ ] Current, [ ] Former, [x ] Never  Pack Years:    MEDICATIONS:  Pulmonary:  ALBUTerol/ipratropium for Nebulization 3milliLiter(s) Nebulizer every 4 hours  fluticasone / salmeterol 250-50 MICROgram(s) Diskus 1Dose(s) Inhalation two times a day  sodium chloride 3%  Inhalation 5milliLiter(s) Inhalation once    Antimicrobials:    Anticoagulants:  heparin  Injectable 5000Unit(s) SubCutaneous every 8 hours    Onc:    GI/:    Endocrine:  methimazole 10milliGRAM(s) Oral every 8 hours  insulin lispro (HumaLOG) corrective regimen sliding scale  SubCutaneous Before meals and at bedtime  predniSONE   Tablet 60milliGRAM(s) Oral daily    Cardiac:    Other Medications:  LORazepam     Tablet 0.5milliGRAM(s) Oral every 12 hours      Allergies    No Known Allergies    Intolerances        Vital Signs Last 24 Hrs  T(C): 37.1, Max: 37.1 (02-12 @ 18:39)  T(F): 98.7, Max: 98.8 (02-12 @ 18:39)  HR: 96 (80 - 114)  BP: 101/56 (98/47 - 119/54)  BP(mean): 70 (56 - 80)  RR: 29 (23 - 38)  SpO2: 96% (95% - 100%)        PHYSICAL EXAM:    General: Well developed; well nourished; in no acute distress  Eyes: PERRL, EOM intact; conjunctiva and sclera clear  Head: Normocephalic; atraumatic  ENMT: No nasal discharge; airway clear, MMM  Neck: Supple; non tender; no masses  Respiratory: good BS b/l, diffuse expiratory wheezing more anteriorly, no crackles or rhonchi.   Cardiovascular: Regular rate and rhythm. S1 and S2 Normal; No murmurs, gallops or rubs  Gastrointestinal: Soft non-tender non-distended;  Extremities: No clubbing, cyanosis or edema  Vascular: Peripheral pulses palpable 2+ bilaterally  Neurological: Alert and oriented   Skin: Warm and dry. No obvious rash  Lymph Nodes: No acute cervical or supraclavicular adenopathy  Musculoskeletal: Normal tone, without deformities  Psychiatric: Cooperative     LABS:  ABG - ( 12 Feb 2017 09:24 )  pH: 7.46  /  pCO2: 32    /  pO2: 127   / HCO3: 22    / Base Excess: -0.5  /  SaO2: 98                  CBC Full  -  ( 13 Feb 2017 06:03 )  WBC Count : 22.8 K/uL  Hemoglobin : 12.5 g/dL  Hematocrit : 37.9 %  Platelet Count - Automated : 269 K/uL  Mean Cell Volume : 87.9 fL  Mean Cell Hemoglobin : 29.0 pg  Mean Cell Hemoglobin Concentration : 33.0 g/dL  Auto Neutrophil # : x  Auto Lymphocyte # : x  Auto Monocyte # : x  Auto Eosinophil # : x  Auto Basophil # : x  Auto Neutrophil % : x  Auto Lymphocyte % : x  Auto Monocyte % : x  Auto Eosinophil % : x  Auto Basophil % : x    13 Feb 2017 06:02    144    |  108    |  21     ----------------------------<  90     3.6     |  27     |  0.66     Ca    8.1        13 Feb 2017 06:02  Phos  3.2       13 Feb 2017 06:02  Mg     2.2       13 Feb 2017 06:02                        RADIOLOGY & ADDITIONAL STUDIES (The following images were personally reviewed): CT chest, CXR

## 2017-02-13 NOTE — PROGRESS NOTE ADULT - SUBJECTIVE AND OBJECTIVE BOX
PGY-1 [facility] SERVICE TRANSFER ACCEPTANCE NOTE  Hospital Course:  81yo F recently emigrated from [facility] (11/2016) w/ PMH hyperthyroidism, 25 year hx of asthma (w/ recent ER visit at [facility] for exacerbation w/ +flu, received albuterol/prednisone/tamiflu, d/c-ed on theophylline; no hx of intubations). Pt was unable to fill her meds 2/2 insurance issues and then presented to [facility] 2/9 w/ worsening dyspnea, cough, and unable to minimally ambulate w/o shortness of breath w/ 3 additional hospitalizations for asthma this past year. In the ED initial VS T 97.4F, HR 80s, BP /65-92, RR 20, satting at 96% on room air. In ED pt received 2g Mg, Solumedrol 125mg IVP x1, Duonebs, and placed on BiPAP for respiratory distress. She began intermittently taking off mask and ambulating at which time she became progressively more dyspneic w/ sat coming down to 95% on RA off BiPAP and subsequently RR up to 40.Labs: WBC 15.1, ABG 7.39/35/142/21, serum Bicarb 27. ICU consult called for acute asthma exacerbation w/ increased pCO2.    Pt was admitted to the ICU from 2/10-2/13. While in the ICU pt was successfully treated for an asthma exacerbation with prednisone, advair, duonebs. Ativan was added on with suspicion that there was a component of anxiety triggering asthma. On 2/12 AM pt was tachynepic after exertion; placed on bipap with improvement. Pt did not have further need for bipap after this episode. However pt continued to be tachypneic during ICU visit. Pt had further workup w/ CT chest demonstrated findings consistent with multifocal moderate infectious/inflammatory bronchiolitis. Pulm consulted given appropriate treatment and lack of clinical improvement as pt still tachypneic. Went for echo, EF60% w/ mild impaired LV relaxation. Pt stepped down as HD stable for further workup.    Pt arrived to [facility] telemetry unit 2/13 PM stable; NAD and breathing comfortably but seemingly anxious.     INTERVAL HPI/OVERNIGHT EVENTS:    SUBJECTIVE: Patient seen and examined at bedside.    OBJECTIVE:    VITAL SIGNS:  ICU Vital Signs Last 24 Hrs  T(C): 36.9, Max: 37.1 (02-13 @ 05:00)  T(F): 98.4, Max: 98.7 (02-13 @ 05:00)  HR: 80 (80 - 114)  BP: 115/55 (99/50 - 116/56)  BP(mean): 79 (63 - 80)  ABP: --  ABP(mean): --  RR: 20 (20 - 38)  SpO2: 99% (96% - 100%)        CAPILLARY BLOOD GLUCOSE  91 (13 Feb 2017 16:00)      PHYSICAL EXAM:    General: NAD  HEENT: NC/AT; PERRL, clear conjunctiva  Neck: supple  Respiratory: CTA b/l  Cardiovascular: +S1/S2; RRR  Abdomen: soft, NT/ND; +BS x4  Extremities: WWP, 2+ peripheral pulses b/l; no LE edema  Skin: normal color and turgor; no rash  Neurological:     MEDICATIONS:  MEDICATIONS  (STANDING):  methimazole 10milliGRAM(s) Oral every 8 hours  ALBUTerol/ipratropium for Nebulization 3milliLiter(s) Nebulizer every 4 hours  insulin lispro (HumaLOG) corrective regimen sliding scale  SubCutaneous Before meals and at bedtime  fluticasone / salmeterol 250-50 MICROgram(s) Diskus 1Dose(s) Inhalation two times a day  heparin  Injectable 5000Unit(s) SubCutaneous every 8 hours  predniSONE   Tablet 60milliGRAM(s) Oral daily  LORazepam     Tablet 0.5milliGRAM(s) Oral every 12 hours  sodium chloride 3%  Inhalation 5milliLiter(s) Inhalation once    MEDICATIONS  (PRN):      ALLERGIES:  Allergies    No Known Allergies    Intolerances        LABS:                        12.5   22.8  )-----------( 269      ( 13 Feb 2017 06:03 )             37.9     13 Feb 2017 06:02    144    |  108    |  21     ----------------------------<  90     3.6     |  27     |  0.66     Ca    8.1        13 Feb 2017 06:02  Phos  3.2       13 Feb 2017 06:02  Mg     2.2       13 Feb 2017 06:02            RADIOLOGY & ADDITIONAL TESTS: Reviewed.    ASSESSMENT:     PLAN:    FEN - no IVF; replete lytes prn; NPO    PPx - HSQ    CODE - FULL.    DISPO - continue telemetry monitoring in ICU-step down level of care on [facility]. PGY-1 [facility] SERVICE TRANSFER ACCEPTANCE NOTE  Hospital Course:  83yo F recently emigrated from [facility] (11/2016) w/ PMH hyperthyroidism, 25 year hx of asthma (w/ recent ER visit at [facility] for exacerbation w/ +flu, received albuterol/prednisone/tamiflu, d/c-ed on theophylline; no hx of intubations). Pt was unable to fill her meds 2/2 insurance issues and then presented to [facility] 2/9 w/ worsening dyspnea, cough, and unable to minimally ambulate w/o shortness of breath w/ 3 additional hospitalizations for asthma this past year. In the ED initial VS T 97.4F, HR 80s, BP /65-92, RR 20, satting at 96% on room air. In ED pt received 2g Mg, Solumedrol 125mg IVP x1, Duonebs, and placed on BiPAP for respiratory distress. She began intermittently taking off mask and ambulating at which time she became progressively more dyspneic w/ sat coming down to 95% on RA off BiPAP and subsequently RR up to 40.Labs: WBC 15.1, ABG 7.39/35/142/21, serum Bicarb 27. ICU consult called for acute asthma exacerbation w/ increased pCO2.    Pt was admitted to the ICU from 2/10-2/13. While in the ICU pt was successfully treated for an asthma exacerbation with prednisone, advair, duonebs. Ativan was added on with suspicion that there was a component of anxiety triggering asthma. On 2/12 AM pt was tachynepic after exertion; placed on bipap with improvement. Pt did not have further need for bipap after this episode. However pt continued to be tachypneic during ICU visit. Pt had further workup w/ CT chest demonstrated findings consistent with multifocal moderate infectious/inflammatory bronchiolitis. Pulm consulted given appropriate treatment and lack of clinical improvement as pt still tachypneic. Went for echo, EF60% w/ mild impaired LV relaxation. Pt stepped down as HD stable for further workup.    Pt arrived to [facility] telemetry unit 2/13 PM stable; NAD and breathing comfortably but seemingly anxious.     INTERVAL HPI/OVERNIGHT EVENTS:    SUBJECTIVE: Patient seen and examined at bedside.    OBJECTIVE:    VITAL SIGNS:  ICU Vital Signs Last 24 Hrs  T(C): 36.9, Max: 37.1 (02-13 @ 05:00)  T(F): 98.4, Max: 98.7 (02-13 @ 05:00)  HR: 80 (80 - 114)  BP: 115/55 (99/50 - 116/56)  BP(mean): 79 (63 - 80)  ABP: --  ABP(mean): --  RR: 20 (20 - 38)  SpO2: 99% (96% - 100%)        CAPILLARY BLOOD GLUCOSE  91 (13 Feb 2017 16:00)      PHYSICAL EXAM:    General: anxious appearing female but NAD  HEENT: NC/AT; PERRL, clear conjunctiva; MMM  Neck: supple, no JVD  Respiratory: +wet cough with b/l rhonchi and scant wheezing throughout; no retractions  Cardiovascular: +S1/S2; RRR  Abdomen: soft, NT/ND; +BS x4  Extremities: WWP, no LE edema  Vascular: 2+ radial, DP, PT pulses  Skin: normal color and turgor; no rash  Neurological: A&O; cooperative and responds to verbal commands    MEDICATIONS:  MEDICATIONS  (STANDING):  methimazole 10milliGRAM(s) Oral every 8 hours  ALBUTerol/ipratropium for Nebulization 3milliLiter(s) Nebulizer every 4 hours  insulin lispro (HumaLOG) corrective regimen sliding scale  SubCutaneous Before meals and at bedtime  fluticasone / salmeterol 250-50 MICROgram(s) Diskus 1Dose(s) Inhalation two times a day  heparin  Injectable 5000Unit(s) SubCutaneous every 8 hours  predniSONE   Tablet 60milliGRAM(s) Oral daily  LORazepam     Tablet 0.5milliGRAM(s) Oral every 12 hours  sodium chloride 3%  Inhalation 5milliLiter(s) Inhalation once    MEDICATIONS  (PRN):      ALLERGIES:  Allergies    No Known Allergies    Intolerances        LABS:                        12.5   22.8  )-----------( 269      ( 13 Feb 2017 06:03 )             37.9     13 Feb 2017 06:02    144    |  108    |  21     ----------------------------<  90     3.6     |  27     |  0.66     Ca    8.1        13 Feb 2017 06:02  Phos  3.2       13 Feb 2017 06:02  Mg     2.2       13 Feb 2017 06:02            RADIOLOGY & ADDITIONAL TESTS: Reviewed.    ASSESSMENT:     PLAN:    FEN - no IVF; replete lytes prn; NPO    PPx - HSQ    CODE - FULL.    DISPO - continue telemetry monitoring in ICU-step down level of care on [facility]. PGY-1 [facility] SERVICE TRANSFER ACCEPTANCE NOTE  Hospital Course:  81yo F recently emigrated from [facility] (11/2016) w/ PMH hyperthyroidism, 25 year hx of asthma (w/ recent ER visit at [facility] for exacerbation w/ +flu, received albuterol/prednisone/tamiflu, d/c-ed on theophylline; no hx of intubations). Pt was unable to fill her meds 2/2 insurance issues and then presented to [facility] 2/9 w/ worsening dyspnea, cough, and unable to minimally ambulate w/o shortness of breath w/ 3 additional hospitalizations for asthma this past year. In the ED initial VS T 97.4F, HR 80s, BP /65-92, RR 20, satting at 96% on room air. In ED pt received 2g Mg, Solumedrol 125mg IVP x1, Duonebs, and placed on BiPAP for respiratory distress. She began intermittently taking off mask and ambulating at which time she became progressively more dyspneic w/ sat coming down to 95% on RA off BiPAP and subsequently RR up to 40.Labs: WBC 15.1, ABG 7.39/35/142/21, serum Bicarb 27. ICU consult called for acute asthma exacerbation w/ increased pCO2.    Pt was admitted to the ICU from 2/10-2/13. While in the ICU pt was successfully treated for an asthma exacerbation with prednisone, advair, duonebs. Ativan was added on with suspicion that there was a component of anxiety triggering asthma. On 2/12 AM pt was tachynepic after exertion; placed on bipap with improvement. Pt did not have further need for bipap after this episode. However pt continued to be tachypneic during ICU visit. Pt had further workup w/ CT chest demonstrated findings consistent with multifocal moderate infectious/inflammatory bronchiolitis. Pulm consulted given appropriate treatment and lack of clinical improvement as pt still tachypneic. Went for echo, EF60% w/ mild impaired LV relaxation. Pt stepped down as HD stable for further workup.    Pt arrived to [facility] telemetry unit 2/13 PM stable; NAD and breathing comfortably but seemingly anxious.     INTERVAL HPI/OVERNIGHT EVENTS: stepped down to [facility], see above.    SUBJECTIVE: Patient seen and examined at bedside. Feels anxious, particularly about male non-medical/staff personnel in her room.     OBJECTIVE:    VITAL SIGNS:  ICU Vital Signs Last 24 Hrs  T(C): 36.9, Max: 37.1 (02-13 @ 05:00)  T(F): 98.4, Max: 98.7 (02-13 @ 05:00)  HR: 80 (80 - 114)  BP: 115/55 (99/50 - 116/56)  BP(mean): 79 (63 - 80)  ABP: --  ABP(mean): --  RR: 20 (20 - 38)  SpO2: 99% (96% - 100%)        CAPILLARY BLOOD GLUCOSE  91 (13 Feb 2017 16:00)      PHYSICAL EXAM:    General: anxious appearing female but NAD  HEENT: NC/AT; PERRL, clear conjunctiva; MMM  Neck: supple, no JVD  Respiratory: +wet cough with b/l rhonchi and scant wheezing throughout; no retractions  Cardiovascular: +S1/S2; RRR  Abdomen: soft, NT/ND; +BS x4  Extremities: WWP, no LE edema  Vascular: 2+ radial, DP, PT pulses  Skin: normal color and turgor; no rash  Neurological: A&O; cooperative and responds to verbal commands    MEDICATIONS:  MEDICATIONS  (STANDING):  methimazole 10milliGRAM(s) Oral every 8 hours  ALBUTerol/ipratropium for Nebulization 3milliLiter(s) Nebulizer every 4 hours  insulin lispro (HumaLOG) corrective regimen sliding scale  SubCutaneous Before meals and at bedtime  fluticasone / salmeterol 250-50 MICROgram(s) Diskus 1Dose(s) Inhalation two times a day  heparin  Injectable 5000Unit(s) SubCutaneous every 8 hours  predniSONE   Tablet 60milliGRAM(s) Oral daily  LORazepam     Tablet 0.5milliGRAM(s) Oral every 12 hours  sodium chloride 3%  Inhalation 5milliLiter(s) Inhalation once    MEDICATIONS  (PRN):      ALLERGIES:  Allergies    No Known Allergies    Intolerances        LABS:                        12.5   22.8  )-----------( 269      ( 13 Feb 2017 06:03 )             37.9     13 Feb 2017 06:02    144    |  108    |  21     ----------------------------<  90     3.6     |  27     |  0.66     Ca    8.1        13 Feb 2017 06:02  Phos  3.2       13 Feb 2017 06:02  Mg     2.2       13 Feb 2017 06:02            RADIOLOGY & ADDITIONAL TESTS: Reviewed.    ASSESSMENT:     81yo F w/ PMH hyperthyroidism, extensive hx asthma w/ exacerbations and hospitalizations x3 this year presenting with asthma exacerbation.     PLAN:    #Asthma exacerbation - s/p MICU admission 2/10-2/13, did not require intubation; improving w/ prednisone, advair, duonebs; may be component of underlying anxiety provoking asthma sx; exacerbation c/b CT chest revealing infectious vs. inflammatory bronchiolitis   - prednisone 60mg PO daily to stop after 5 day course (on 2/16)  - advair 250/50 BID  - duonebs q4h standing  - ativan 0.5mg IVP BID PRN for anxiety  - supplemental O2 via 2L NC with low threshold to resume BiPAP if respiratory distress  - f/u aspergillus Ab - per MICU team had CT chest to r/o aspergillosis but only showed bronchiolitis  - f/u serum IgE   - obtain AFB cx to r/o HOMA per pulm recs (no need for isolation, not r/o TB)   - f/u additional pulm recs (appreciated)    #Hyperthyroidism - per MICU team/hx, pt may not be able to afford medication as outpt  - methimazole 10mg q8h  - case mgmt/SW once dispo planning begins for medication affordability    #Leukocytosis - most likely 2/2 corticosteroids vs. less likely infection; pt afebrile since admission  - monitor CBC and for infectious signs/sx    FEN - no IVF; replete lytes prn to maintain K>4, Mg>2; regular diet    PPx - HSQ, SCDs; moderate dose ISF-25 lispro sliding scale AC+qHS while on corticosteroids    CODE - FULL.    DISPO - s/p step down from MICU to [facility] telemetry for further mgmt of asthma exacerbation PGY-1 [facility] SERVICE TRANSFER ACCEPTANCE NOTE  Hospital Course:  81yo F recently emigrated from [facility] (11/2016) w/ PMH hyperthyroidism, 25 year hx of asthma (w/ recent ER visit at [facility] for exacerbation w/ +flu, received albuterol/prednisone/tamiflu, d/c-ed on theophylline; no hx of intubations). Pt was unable to fill her meds 2/2 insurance issues and then presented to [facility] 2/9 w/ worsening dyspnea, cough, and unable to minimally ambulate w/o shortness of breath w/ 3 additional hospitalizations for asthma this past year. In the ED initial VS T 97.4F, HR 80s, BP /65-92, RR 20, satting at 96% on room air. In ED pt received 2g Mg, Solumedrol 125mg IVP x1, Duonebs, and placed on BiPAP for respiratory distress. She began intermittently taking off mask and ambulating at which time she became progressively more dyspneic w/ sat coming down to 95% on RA off BiPAP and subsequently RR up to 40.Labs: WBC 15.1, ABG 7.39/35/142/21, serum Bicarb 27. ICU consult called for acute asthma exacerbation w/ increased pCO2.    Pt was admitted to the ICU from 2/10-2/13. While in the ICU pt was successfully treated for an asthma exacerbation with prednisone, advair, duonebs. Ativan was added on with suspicion that there was a component of anxiety triggering asthma. On 2/12 AM pt was tachynepic after exertion; placed on bipap with improvement. Pt did not have further need for bipap after this episode. However pt continued to be tachypneic during ICU visit. Pt had further workup w/ CT chest demonstrated findings consistent with multifocal moderate infectious/inflammatory bronchiolitis. Pulm consulted given appropriate treatment and lack of clinical improvement as pt still tachypneic. Went for echo, EF60% w/ mild impaired LV relaxation. Pt stepped down as HD stable for further workup.    Pt arrived to [facility] telemetry unit 2/13 PM stable; NAD and breathing comfortably but seemingly anxious.     INTERVAL HPI/OVERNIGHT EVENTS: stepped down to [facility], see above.    SUBJECTIVE: Patient seen and examined at bedside. Feels anxious, particularly about male non-medical/staff personnel in her room.     OBJECTIVE:    VITAL SIGNS:  ICU Vital Signs Last 24 Hrs  T(C): 36.9, Max: 37.1 (02-13 @ 05:00)  T(F): 98.4, Max: 98.7 (02-13 @ 05:00)  HR: 80 (80 - 114)  BP: 115/55 (99/50 - 116/56)  BP(mean): 79 (63 - 80)  ABP: --  ABP(mean): --  RR: 20 (20 - 38)  SpO2: 99% (96% - 100%)        CAPILLARY BLOOD GLUCOSE  91 (13 Feb 2017 16:00)      PHYSICAL EXAM:    General: anxious appearing female but NAD  HEENT: NC/AT; PERRL, clear conjunctiva; MMM  Neck: supple, no JVD  Respiratory: +wet cough with b/l rhonchi and scant wheezing throughout; no retractions  Cardiovascular: +S1/S2; RRR  Abdomen: soft, NT/ND; +BS x4  Extremities: WWP, no LE edema  Vascular: 2+ radial, DP, PT pulses  Skin: normal color and turgor; no rash  Neurological: A&O; cooperative and responds to verbal commands    MEDICATIONS:  MEDICATIONS  (STANDING):  methimazole 10milliGRAM(s) Oral every 8 hours  ALBUTerol/ipratropium for Nebulization 3milliLiter(s) Nebulizer every 4 hours  insulin lispro (HumaLOG) corrective regimen sliding scale  SubCutaneous Before meals and at bedtime  fluticasone / salmeterol 250-50 MICROgram(s) Diskus 1Dose(s) Inhalation two times a day  heparin  Injectable 5000Unit(s) SubCutaneous every 8 hours  predniSONE   Tablet 60milliGRAM(s) Oral daily  LORazepam     Tablet 0.5milliGRAM(s) Oral every 12 hours  sodium chloride 3%  Inhalation 5milliLiter(s) Inhalation once    MEDICATIONS  (PRN):      ALLERGIES:  Allergies    No Known Allergies    Intolerances        LABS:                        12.5   22.8  )-----------( 269      ( 13 Feb 2017 06:03 )             37.9     13 Feb 2017 06:02    144    |  108    |  21     ----------------------------<  90     3.6     |  27     |  0.66     Ca    8.1        13 Feb 2017 06:02  Phos  3.2       13 Feb 2017 06:02  Mg     2.2       13 Feb 2017 06:02            RADIOLOGY & ADDITIONAL TESTS: Reviewed.    ASSESSMENT:     81yo F w/ PMH hyperthyroidism, extensive hx asthma w/ exacerbations and hospitalizations x3 this year presenting with asthma exacerbation.     PLAN:    #Asthma exacerbation - s/p MICU admission 2/10-2/13, did not require intubation; improving w/ prednisone, advair, duonebs; however current exacerbation may be component of underlying anxiety sx as well as possible underlying ABPA vs. ?HOMA per pulm; exacerbation c/b CT chest revealing infectious vs. inflammatory bronchiolitis and small airway disease  - prednisone 60mg PO daily to stop after 5 day course (on 2/16)  - advair 250/50 BID  - duonebs q4h standing  - ativan 0.5mg IVP BID PRN for anxiety  - supplemental O2 via 2L NC with low threshold to resume BiPAP if respiratory distress  - f/u aspergillus Ab  - f/u serum IgE   - obtain AFB cx to r/o HOMA per pulm recs (no need for isolation, not r/o TB)   - f/u additional pulm recs (appreciated)    #Hyperthyroidism - per MICU team/hx, pt may not be able to afford medication as outpt  - methimazole 10mg q8h  - case mgmt/SW once dispo planning begins for medication affordability    #Leukocytosis - most likely 2/2 corticosteroids vs. less likely infection; pt afebrile since admission  - monitor CBC and for infectious signs/sx    FEN - no IVF; replete lytes prn to maintain K>4, Mg>2; regular diet    PPx - HSQ, SCDs; moderate dose ISF-25 lispro sliding scale AC+qHS while on corticosteroids    CODE - FULL.    DISPO - s/p step down from MICU to -[facility] telemetry for further mgmt of asthma exacerbation

## 2017-02-13 NOTE — PROGRESS NOTE ADULT - SUBJECTIVE AND OBJECTIVE BOX
INTERVAL HPI/OVERNIGHT EVENTS:    OVERNIGHT: No overnight events.  SUBJECTIVE: Patient seen and examined at bedside.     ROS:  CV: Denies chest pain  Resp: Denies SOB  GI: Denies abdominal pain, constipation, diarrhea, nausea, vomiting  : Denies dysuria  ID: Denies fevers, chills  MSK: Denies joint pain     OBJECTIVE:    VITAL SIGNS:  ICU Vital Signs Last 24 Hrs  T(C): 37.1, Max: 37.1 (02-12 @ 18:39)  T(F): 98.7, Max: 98.8 (02-12 @ 18:39)  HR: 82 (72 - 97)  BP: 99/50 (98/47 - 128/52)  BP(mean): 74 (56 - 77)  ABP: --  ABP(mean): --  RR: 31 (23 - 39)  SpO2: 97% (95% - 100%)        CAPILLARY BLOOD GLUCOSE  92 (13 Feb 2017 06:00)      PHYSICAL EXAM:    General: NAD, comfortable  HEENT: NCAT, PERRL, clear conjunctiva, no scleral icterus; tongue moist, no thrush  Neck: supple, no JVD  Respiratory:  Diffuse expiratory wheezing  Cardiovascular: RRR,  Loud S1S2, no M/R/G  Abdomen: soft, NT/ND, bowel sounds in all four quadrants, no palpable masses  Extremities: WWP, no clubbing, cyanosis, or edema  Neuro: Normal strength.   Vasc: 2+ carotid, femoral and DP pulses  MSK: No erythema or joint swelling    MEDICATIONS:  MEDICATIONS  (STANDING):  methimazole 10milliGRAM(s) Oral every 8 hours  ALBUTerol/ipratropium for Nebulization 3milliLiter(s) Nebulizer every 4 hours  insulin lispro (HumaLOG) corrective regimen sliding scale  SubCutaneous Before meals and at bedtime  fluticasone / salmeterol 250-50 MICROgram(s) Diskus 1Dose(s) Inhalation two times a day  heparin  Injectable 5000Unit(s) SubCutaneous every 8 hours  predniSONE   Tablet 60milliGRAM(s) Oral daily  LORazepam     Tablet 0.5milliGRAM(s) Oral every 12 hours    MEDICATIONS  (PRN):      ALLERGIES:  Allergies    No Known Allergies    Intolerances        LABS:                        12.5   22.8  )-----------( 269      ( 13 Feb 2017 06:03 )             37.9     13 Feb 2017 06:02    144    |  108    |  21     ----------------------------<  90     3.6     |  27     |  0.66     Ca    8.1        13 Feb 2017 06:02  Phos  3.2       13 Feb 2017 06:02  Mg     2.2       13 Feb 2017 06:02      RADIOLOGY & ADDITIONAL TESTS: Reviewed.    ASSESSMENT- asthma exacerbation likely 2/2 influenza    Pulm  #Asthma exacerbation. Admitted to MICU on 2/10. Pt with asthma x 25 yrs, no intubations, recent (Feb 5-7) admission to [facility] for asthma exacerbation 2/2 influenza, here with worsening respiratory status likely due to inability to complete fill medications 2/2 underinsurance. Per patient's son (an anesthesiologist in [facility] now living in [place]yrs), he was giving her albuterol and nasonex at home, was unable to procure other medications. Pt placed on bipap in ED but was removing to go to restroom, resulting in worsening respiratory status, tachypnea. Respiratory status continued to improve on bipap. Was on 4L NC satting well. Now on Bipap after tachypneic on 2/12    -c/w prednisone 60 daily, Advair   -tamiflu    -duonebs 3ml q4h  -added ativan .5  q12 with suspicion of possible anxiety component   -CT chest demonstrated findings consistent with multifocal moderate infectious/inflammatory bronchiolitis  -ECHO given exacerbation refractory    Endo  #Hyperthyroidism  -c/w methimazole 10mg PO q8h  #Steroid use  -c/w ISS    FEN: No IVF, replete lytes PRN, Consistent Carb w/ evening snack  PPX: HSQ  LINES: Peripheral   Walker: None  Dispo: MICU  Code: Full PGY1 MICU step down note:    82F recently immigrated from [facility] 11/2016 PMH hyperthyroidism, 25y h/o asthma (recent ER visit at [facility] for exacerbation w/ +flu received albuterol/prednisone/tamiflu, d/c-ed on theophylline; no hx of intubations). Pt was unable to fill her meds 2/2 insurance issues and now presented w/ worsening dyspnea, cough, and inability to minimally ambulate w/o shortness of breath w/ 3 additional hospitalizations for asthma this past yea. In the ED VS: Initially T 97.4F, HR 80s, BP /65-92, RR 20, satting at 96% on room air. In ED pt received 2g Mg, Solumedrol 125mg IVP x1, Duonebs, and placed on BiPAP for respiratory distress. She began intermittently taking off mask and ambulating at which time she became Pt became progressively more dyspneic w/ sat coming down to 95% on RA off BiPAP and subsequently RR up to 40.Labs: WBC 15.1, ABG 7.39/35/142/21, serum Bicarb 27. ICU consult called for acute asthma exacerbation w/ increased pCO2.    Pt was admitted to the ICU from 2/10-2/13. While in the ICU pt was appropriately treated for an asthma exacerbation with prednisone, advair, duonebs. Ativan was added on with suspicion that there was a component of anxiety triggering asthma. On 2/12 in am pt was tachynepic after exertion placed on bipap with improvement. Pt did not have further need for bipap after this episode. Pt continued to be tachypneic during ICU visit. Pt had further workup w/ CT chest demonstrated findings consistent with multifocal moderate infectious/inflammatory bronchiolitis. Will have ECHO given and pulm consulted given appropriate treatment and lack of clinical improvement as pt still tachypneic. Pt stepped down HD stable for further workup.     OVERNIGHT: No overnight events.  SUBJECTIVE: Patient seen and examined at bedside.     ROS:  CV: Denies chest pain  Resp: Denies SOB  GI: Denies abdominal pain, constipation, diarrhea, nausea, vomiting  : Denies dysuria  ID: Denies fevers, chills  MSK: Denies joint pain     OBJECTIVE:    VITAL SIGNS:  ICU Vital Signs Last 24 Hrs  T(C): 37.1, Max: 37.1 (02-12 @ 18:39)  T(F): 98.7, Max: 98.8 (02-12 @ 18:39)  HR: 82 (72 - 97)  BP: 99/50 (98/47 - 128/52)  BP(mean): 74 (56 - 77)  ABP: --  ABP(mean): --  RR: 31 (23 - 39)  SpO2: 97% (95% - 100%)        CAPILLARY BLOOD GLUCOSE  92 (13 Feb 2017 06:00)      PHYSICAL EXAM:    General: NAD, comfortable  HEENT: NCAT, PERRL, clear conjunctiva, no scleral icterus; tongue moist, no thrush  Neck: supple, no JVD  Respiratory:  Diffuse expiratory wheezing  Cardiovascular: RRR,  Loud S1S2, no M/R/G  Abdomen: soft, NT/ND, bowel sounds in all four quadrants, no palpable masses  Extremities: WWP, no clubbing, cyanosis, or edema  Neuro: Normal strength.   Vasc: 2+ carotid, femoral and DP pulses  MSK: No erythema or joint swelling    MEDICATIONS:  MEDICATIONS  (STANDING):  methimazole 10milliGRAM(s) Oral every 8 hours  ALBUTerol/ipratropium for Nebulization 3milliLiter(s) Nebulizer every 4 hours  insulin lispro (HumaLOG) corrective regimen sliding scale  SubCutaneous Before meals and at bedtime  fluticasone / salmeterol 250-50 MICROgram(s) Diskus 1Dose(s) Inhalation two times a day  heparin  Injectable 5000Unit(s) SubCutaneous every 8 hours  predniSONE   Tablet 60milliGRAM(s) Oral daily  LORazepam     Tablet 0.5milliGRAM(s) Oral every 12 hours    MEDICATIONS  (PRN):      ALLERGIES:  Allergies    No Known Allergies    Intolerances        LABS:                        12.5   22.8  )-----------( 269      ( 13 Feb 2017 06:03 )             37.9     13 Feb 2017 06:02    144    |  108    |  21     ----------------------------<  90     3.6     |  27     |  0.66     Ca    8.1        13 Feb 2017 06:02  Phos  3.2       13 Feb 2017 06:02  Mg     2.2       13 Feb 2017 06:02      RADIOLOGY & ADDITIONAL TESTS: Reviewed.    ASSESSMENT- asthma exacerbation likely 2/2 influenza    Pulm  #Asthma exacerbation. Admitted to MICU on 2/10. Pt with asthma x 25 yrs, no intubations, recent (Feb 5-7) admission to [facility] for asthma exacerbation 2/2 influenza, here with worsening respiratory status likely due to inability to complete fill medications 2/2 underinsurance. Per patient's son (an anesthesiologist in [facility] now [place] in [place] 7yrs), he was giving her albuterol and nasonex at home, was unable to procure other medications. Pt placed on bipap in ED but was removing to go to restroom, resulting in worsening respiratory status, tachypnea. Respiratory status continued to improve on bipap. Was on 4L NC satting well. Now on Bipap after tachypneic on 2/12    -c/w prednisone 60 daily, Advair   -tamiflu    -duonebs 3ml q4h  -added ativan .5  q12 with suspicion of possible anxiety component   -CT chest demonstrated findings consistent with multifocal moderate infectious/inflammatory bronchiolitis  -ECHO given exacerbation refractory  -will obtain pulm consult given appropriate treatment and lack of clinical improvement as pt still tachypneic     Endo  #Hyperthyroidism  -c/w methimazole 10mg PO q8h  #Steroid use  -c/w ISS    FEN: No IVF, replete lytes PRN, Consistent Carb w/ evening snack  PPX: HSQ  LINES: Peripheral   Walker: None  Dispo: MICU  Code: Full PGY1 MICU step down note:    82F recently immigrated from [facility] 11/2016 PMH hyperthyroidism, 25y h/o asthma (recent ER visit at [facility] for exacerbation w/ +flu received albuterol/prednisone/tamiflu, d/c-ed on theophylline; no hx of intubations). Pt was unable to fill her meds 2/2 insurance issues and now presented w/ worsening dyspnea, cough, and inability to minimally ambulate w/o shortness of breath w/ 3 additional hospitalizations for asthma this past yea. In the ED VS: Initially T 97.4F, HR 80s, BP /65-92, RR 20, satting at 96% on room air. In ED pt received 2g Mg, Solumedrol 125mg IVP x1, Duonebs, and placed on BiPAP for respiratory distress. She began intermittently taking off mask and ambulating at which time she became Pt became progressively more dyspneic w/ sat coming down to 95% on RA off BiPAP and subsequently RR up to 40.Labs: WBC 15.1, ABG 7.39/35/142/21, serum Bicarb 27. ICU consult called for acute asthma exacerbation w/ increased pCO2.    Pt was admitted to the ICU from 2/10-2/13. While in the ICU pt was appropriately treated for an asthma exacerbation with prednisone, advair, duonebs. Ativan was added on with suspicion that there was a component of anxiety triggering asthma. On 2/12 in am pt was tachynepic after exertion placed on bipap with improvement. Pt did not have further need for bipap after this episode. Pt continued to be tachypneic during ICU visit. Pt had further workup w/ CT chest demonstrated findings consistent with multifocal moderate infectious/inflammatory bronchiolitis. Will have ECHO given and pulm consulted given appropriate treatment and lack of clinical improvement as pt still tachypneic. Pt stepped down HD stable for further workup.     OVERNIGHT: No overnight events.  SUBJECTIVE: Patient seen and examined at bedside.     ROS:  CV: Denies chest pain  Resp: Denies SOB  GI: Denies abdominal pain, constipation, diarrhea, nausea, vomiting  : Denies dysuria  ID: Denies fevers, chills  MSK: Denies joint pain     OBJECTIVE:    VITAL SIGNS:  ICU Vital Signs Last 24 Hrs  T(C): 37.1, Max: 37.1 (02-12 @ 18:39)  T(F): 98.7, Max: 98.8 (02-12 @ 18:39)  HR: 82 (72 - 97)  BP: 99/50 (98/47 - 128/52)  BP(mean): 74 (56 - 77)  ABP: --  ABP(mean): --  RR: 31 (23 - 39)  SpO2: 97% (95% - 100%)        CAPILLARY BLOOD GLUCOSE  92 (13 Feb 2017 06:00)      PHYSICAL EXAM:    General: NAD, comfortable  HEENT: NCAT, PERRL, clear conjunctiva, no scleral icterus; tongue moist, no thrush  Neck: supple, no JVD  Respiratory:  Diffuse expiratory wheezing  Cardiovascular: RRR,  Loud S1S2, no M/R/G  Abdomen: soft, NT/ND, bowel sounds in all four quadrants, no palpable masses  Extremities: WWP, no clubbing, cyanosis, or edema  Neuro: Normal strength.   Vasc: 2+ carotid, femoral and DP pulses  MSK: No erythema or joint swelling    MEDICATIONS:  MEDICATIONS  (STANDING):  methimazole 10milliGRAM(s) Oral every 8 hours  ALBUTerol/ipratropium for Nebulization 3milliLiter(s) Nebulizer every 4 hours  insulin lispro (HumaLOG) corrective regimen sliding scale  SubCutaneous Before meals and at bedtime  fluticasone / salmeterol 250-50 MICROgram(s) Diskus 1Dose(s) Inhalation two times a day  heparin  Injectable 5000Unit(s) SubCutaneous every 8 hours  predniSONE   Tablet 60milliGRAM(s) Oral daily  LORazepam     Tablet 0.5milliGRAM(s) Oral every 12 hours    MEDICATIONS  (PRN):      ALLERGIES:  Allergies    No Known Allergies    Intolerances        LABS:                        12.5   22.8  )-----------( 269      ( 13 Feb 2017 06:03 )             37.9     13 Feb 2017 06:02    144    |  108    |  21     ----------------------------<  90     3.6     |  27     |  0.66     Ca    8.1        13 Feb 2017 06:02  Phos  3.2       13 Feb 2017 06:02  Mg     2.2       13 Feb 2017 06:02      RADIOLOGY & ADDITIONAL TESTS: Reviewed.    ASSESSMENT- asthma exacerbation likely 2/2 influenza    Pulm  #Asthma exacerbation. Admitted to MICU on 2/10. Pt with asthma x 25 yrs, no intubations, recent (Feb 5-7) admission to [facility] for asthma exacerbation 2/2 influenza, here with worsening respiratory status likely due to inability to complete fill medications 2/2 underinsurance. Per patient's son (an anesthesiologist in [facility] now living in [place] 7yrs), he was giving her albuterol and nasonex at home, was unable to procure other medications. Pt placed on bipap in ED but was removing to go to restroom, resulting in worsening respiratory status, tachypnea. Respiratory status continued to improve on bipap. Was on 4L NC satting well. Now on Bipap after tachypneic on 2/12    -c/w prednisone 60 daily, Advair   -tamiflu    -duonebs 3ml q4h  -added ativan .5  q12 with suspicion of possible anxiety component   -CT chest demonstrated findings consistent with multifocal moderate infectious/inflammatory bronchiolitis  -ECHO given exacerbation refractory  -will obtain pulm consult given appropriate treatment and lack of clinical improvement as pt still tachypneic. F/U IgE levels for ABPA    Endo  #Hyperthyroidism  -c/w methimazole 10mg PO q8h  #Steroid use  -c/w ISS    FEN: No IVF, replete lytes PRN, Consistent Carb w/ evening snack  PPX: HSQ  LINES: Peripheral   Walker: None  Dispo: MICU  Code: Full

## 2017-02-14 DIAGNOSIS — R06.00 DYSPNEA, UNSPECIFIED: ICD-10-CM

## 2017-02-14 DIAGNOSIS — D72.829 ELEVATED WHITE BLOOD CELL COUNT, UNSPECIFIED: ICD-10-CM

## 2017-02-14 LAB
ALBUMIN SERPL ELPH-MCNC: 2.8 G/DL — LOW (ref 3.4–5)
ALP SERPL-CCNC: 69 U/L — SIGNIFICANT CHANGE UP (ref 40–120)
ALT FLD-CCNC: 39 U/L — SIGNIFICANT CHANGE UP (ref 12–42)
ANION GAP SERPL CALC-SCNC: 4 MMOL/L — LOW (ref 9–16)
AST SERPL-CCNC: 19 U/L — SIGNIFICANT CHANGE UP (ref 15–37)
BILIRUB SERPL-MCNC: 0.6 MG/DL — SIGNIFICANT CHANGE UP (ref 0.2–1.2)
BUN SERPL-MCNC: 21 MG/DL — SIGNIFICANT CHANGE UP (ref 7–23)
CALCIUM SERPL-MCNC: 8.5 MG/DL — SIGNIFICANT CHANGE UP (ref 8.5–10.5)
CHLORIDE SERPL-SCNC: 103 MMOL/L — SIGNIFICANT CHANGE UP (ref 96–108)
CO2 SERPL-SCNC: 29 MMOL/L — SIGNIFICANT CHANGE UP (ref 22–31)
CREAT SERPL-MCNC: 0.73 MG/DL — SIGNIFICANT CHANGE UP (ref 0.5–1.3)
GLUCOSE SERPL-MCNC: 284 MG/DL — HIGH (ref 70–99)
HCT VFR BLD CALC: 38.4 % — SIGNIFICANT CHANGE UP (ref 34.5–45)
HGB BLD-MCNC: 12.7 G/DL — SIGNIFICANT CHANGE UP (ref 11.5–15.5)
IGE SERPL-ACNC: 488 IU/ML — HIGH (ref 0–100)
MAGNESIUM SERPL-MCNC: 2.4 MG/DL — SIGNIFICANT CHANGE UP (ref 1.6–2.4)
MCHC RBC-ENTMCNC: 29.5 PG — SIGNIFICANT CHANGE UP (ref 27–34)
MCHC RBC-ENTMCNC: 33.1 G/DL — SIGNIFICANT CHANGE UP (ref 32–36)
MCV RBC AUTO: 89.3 FL — SIGNIFICANT CHANGE UP (ref 80–100)
PHOSPHATE SERPL-MCNC: 2.4 MG/DL — LOW (ref 2.5–4.5)
PLATELET # BLD AUTO: 272 K/UL — SIGNIFICANT CHANGE UP (ref 150–400)
POTASSIUM SERPL-MCNC: 4.5 MMOL/L — SIGNIFICANT CHANGE UP (ref 3.5–5.3)
POTASSIUM SERPL-SCNC: 4.5 MMOL/L — SIGNIFICANT CHANGE UP (ref 3.5–5.3)
PROT SERPL-MCNC: 7.1 G/DL — SIGNIFICANT CHANGE UP (ref 6.4–8.2)
RBC # BLD: 4.3 M/UL — SIGNIFICANT CHANGE UP (ref 3.8–5.2)
RBC # FLD: 13.4 % — SIGNIFICANT CHANGE UP (ref 10.3–16.9)
SODIUM SERPL-SCNC: 136 MMOL/L — SIGNIFICANT CHANGE UP (ref 135–145)
T4 FREE SERPL-MCNC: 1.83 NG/DL — HIGH (ref 0.7–1.48)
WBC # BLD: 13.4 K/UL — HIGH (ref 3.8–10.5)
WBC # FLD AUTO: 13.4 K/UL — HIGH (ref 3.8–10.5)

## 2017-02-14 PROCEDURE — 99233 SBSQ HOSP IP/OBS HIGH 50: CPT | Mod: GC

## 2017-02-14 PROCEDURE — 71010: CPT | Mod: 26

## 2017-02-14 PROCEDURE — 99232 SBSQ HOSP IP/OBS MODERATE 35: CPT

## 2017-02-14 RX ORDER — ACETAMINOPHEN 500 MG
650 TABLET ORAL EVERY 6 HOURS
Qty: 0 | Refills: 0 | Status: DISCONTINUED | OUTPATIENT
Start: 2017-02-14 | End: 2017-02-21

## 2017-02-14 RX ADMIN — Medication 0.5 MILLIGRAM(S): at 09:47

## 2017-02-14 RX ADMIN — HEPARIN SODIUM 5000 UNIT(S): 5000 INJECTION INTRAVENOUS; SUBCUTANEOUS at 06:09

## 2017-02-14 RX ADMIN — HEPARIN SODIUM 5000 UNIT(S): 5000 INJECTION INTRAVENOUS; SUBCUTANEOUS at 15:31

## 2017-02-14 RX ADMIN — Medication 650 MILLIGRAM(S): at 09:38

## 2017-02-14 RX ADMIN — Medication 6: at 11:24

## 2017-02-14 RX ADMIN — Medication 650 MILLIGRAM(S): at 16:08

## 2017-02-14 RX ADMIN — Medication 650 MILLIGRAM(S): at 15:31

## 2017-02-14 RX ADMIN — HEPARIN SODIUM 5000 UNIT(S): 5000 INJECTION INTRAVENOUS; SUBCUTANEOUS at 22:23

## 2017-02-14 RX ADMIN — Medication: at 19:35

## 2017-02-14 RX ADMIN — Medication 650 MILLIGRAM(S): at 08:30

## 2017-02-14 RX ADMIN — Medication 0.5 MILLIGRAM(S): at 22:23

## 2017-02-14 RX ADMIN — Medication 3 MILLILITER(S): at 09:47

## 2017-02-14 RX ADMIN — Medication 3 MILLILITER(S): at 15:31

## 2017-02-14 RX ADMIN — Medication 60 MILLIGRAM(S): at 06:09

## 2017-02-14 RX ADMIN — Medication 3 MILLILITER(S): at 22:23

## 2017-02-14 RX ADMIN — Medication 3 MILLILITER(S): at 06:09

## 2017-02-14 RX ADMIN — Medication 3 MILLILITER(S): at 02:16

## 2017-02-14 RX ADMIN — FLUTICASONE PROPIONATE AND SALMETEROL 1 DOSE(S): 50; 250 POWDER ORAL; RESPIRATORY (INHALATION) at 19:52

## 2017-02-14 RX ADMIN — FLUTICASONE PROPIONATE AND SALMETEROL 1 DOSE(S): 50; 250 POWDER ORAL; RESPIRATORY (INHALATION) at 07:12

## 2017-02-14 RX ADMIN — Medication 3 MILLILITER(S): at 19:36

## 2017-02-14 NOTE — PROGRESS NOTE ADULT - SUBJECTIVE AND OBJECTIVE BOX
Overnight Events: Patient stepped down from MICU overnight. Patient had anxiety, was tachypneic at times, however saturations were WNL    Subjective: Patient seen and examined at bedside.  used for Polish language, number [id number]. Patient complains of fever, chills, cough, productive of yellow sputum, HA, shortness of breath, and fatigue. Patient denies dizziness, abdominal pain, N/V/D, pain in muscles or joints.     [OBJECTIVE]:    Vital Signs:  T(F): , Max: 98.4 (02-13 @ 18:00)  HR:  (78 - 98)  BP:  (101/56 - 120/62)  BP(mean):  (63 - 80)  RR:  (16 - 34)  SpO2:  (94% - 99%)  Wt(kg): --  CVP(cm H2O): --      CAPILLARY BLOOD GLUCOSE  252 (14 Feb 2017 11:02)      Physcial Exam:  T(F): 98.4  HR: 98  BP: 115/56  RR: 16  SpO2: 97%  Wt(kg): --    General: Looks younger than stated age, AO x 3, NAD, Anxious, Agitated, Ill  HEENT: PERRL/ EOMI, no scleral icterus, no ptosis, MMM, JVD, no thyromegaly or nodules palpated  Respiratory: B/L ronchorous throughout, wheezing in bases, Tachypneic to 26. When speaking patient is able to speak in full sentences, indeed for multiple run on sentences without taking a breath. As per  patient speaks very quickly.   Cardiovascular: Regular, +S1 + S2  Abdomen: Soft, NTND, normoactive bowel sounds, no rebound, no guarding, no suprapubic tenderness  Extremities: No cyanosis, no clubbing, no edema, pulses equal, no calf tenderness  Lymphatic: No cervical/supraclavicular LAD  Neurological: CN II-XII grossly intact, moves all extremities    Medications:  MEDICATIONS  (STANDING):  ALBUTerol/ipratropium for Nebulization 3milliLiter(s) Nebulizer every 4 hours  insulin lispro (HumaLOG) corrective regimen sliding scale  SubCutaneous Before meals and at bedtime  fluticasone / salmeterol 250-50 MICROgram(s) Diskus 1Dose(s) Inhalation two times a day  heparin  Injectable 5000Unit(s) SubCutaneous every 8 hours  predniSONE   Tablet 60milliGRAM(s) Oral daily  LORazepam     Tablet 0.5milliGRAM(s) Oral every 12 hours  sodium chloride 3%  Inhalation 5milliLiter(s) Inhalation once  methimazole 10milliGRAM(s) Oral every 12 hours    MEDICATIONS  (PRN):  acetaminophen   Tablet. 650milliGRAM(s) Oral every 6 hours PRN Moderate Pain (4 - 6)      Allergies:  Allergies    No Known Allergies    Intolerances        Labs:                        12.7   13.4  )-----------( 272      ( 14 Feb 2017 11:45 )             38.4     14 Feb 2017 11:45    136    |  103    |  21     ----------------------------<  284    4.5     |  29     |  0.73     Ca    8.5        14 Feb 2017 11:45  Phos  2.4       14 Feb 2017 11:45  Mg     2.4       14 Feb 2017 11:45    TPro  7.1    /  Alb  2.8    /  TBili  0.6    /  DBili  x      /  AST  19     /  ALT  39     /  AlkPhos  69     14 Feb 2017 11:45          Radiology and other tests:  Reviewed

## 2017-02-14 NOTE — PROGRESS NOTE ADULT - SUBJECTIVE AND OBJECTIVE BOX
HPI: 81y/o F recently emigrated from [facility] w/ pmhx hyperthyroidism, Asthma ( on prednisone), admitted for management of asthma exacerbation. Upon admission, TSH found to be almost completely suppressed. Endocrine consulted for recommendations of further management of hyperthyroidism. History obtained from son, Radu, who says her mother was diagnosed with Hyperthyroidism 2 years ago in [facility] but was never treated. Methimazole was first started 1 week ago while she was admitted to [facility] for an asthma exacerbation. As per the son, no contrast dye was given at [facility]. Patient has been experiencing weight loss, difficulty sleeping, loss of appetite for the past "few months". He denies any tachycardia or palpitations. There is no family history of thyroid diseases in her family nor any autoimmune diseases. However, there is a + family history of DM. Patient admitted with a HbA1c of 6.5, never on any anti-glycemic medications.     Hx of other regimens  Complications: + N/V   Outpatient Endo: No     PMH & Surgical Hx:ASTHMA EXACERBATION; FLU  Asthma  Influenza  Depression  Anxiety  Hyperthyroidism  Asthma exacerbation  S/P hysterectomy      FH:   DM: YES  Thyroid: NO  Autoimmune: NO   Other: NO    SH:  Smoking: NO   Etoh:  Recreational Drugs:  Social Life:    Home Meds:    Current Meds:  methimazole 10milliGRAM(s) Oral every 8 hours  ALBUTerol/ipratropium for Nebulization 3milliLiter(s) Nebulizer every 4 hours  insulin lispro (HumaLOG) corrective regimen sliding scale  SubCutaneous Before meals and at bedtime  fluticasone / salmeterol 250-50 MICROgram(s) Diskus 1Dose(s) Inhalation two times a day  heparin  Injectable 5000Unit(s) SubCutaneous every 8 hours  predniSONE   Tablet 60milliGRAM(s) Oral daily  LORazepam     Tablet 0.5milliGRAM(s) Oral every 12 hours  sodium chloride 3%  Inhalation 5milliLiter(s) Inhalation once  acetaminophen   Tablet. 650milliGRAM(s) Oral every 6 hours PRN      Allergies:  No Known Allergies      ROS:  Denies the following except as indicated.    General: weight loss/weight gain, decreased appetite, fatigue  Eyes: Blurry vision, double vision, visual changes  ENT: Throat pain, changes in voice,   CV: palpitations, SOB, CP, cough  GI: NVD, difficulty swallowing, abdominal pain  : polyuria, dysuria  Endo: abnormal menses, temperature intolerance, decreased libido  MSK: weakness, joint pain  Skin: rash, dryness, diaphoresis  Heme: Easy bruising,bleeding  Neuro: HA, dizziness, lightheadedness, numbness tingling  Psych: Anxiety, Depression    CAPILLARY BLOOD GLUCOSE  252 (14 Feb 2017 11:02)  93 (14 Feb 2017 06:00)  101 (13 Feb 2017 22:56)  91 (13 Feb 2017 16:00)  257 (13 Feb 2017 12:00)        Height (cm): 154.9 (02-10 @ 06:45)  Weight (kg): 59.8 (02-10 @ 06:45)  BMI (kg/m2): 24.9 (02-10 @ 06:45)    Vital Signs Last 24 Hrs  T(C): 36.9, Max: 36.9 (02-13 @ 18:00)  T(F): 98.4, Max: 98.4 (02-13 @ 18:00)  HR: 98 (78 - 108)  BP: 115/56 (101/56 - 120/62)  BP(mean): 80 (63 - 80)  RR: 16 (16 - 36)  SpO2: 97% (94% - 99%)  Constitutional: wn/wd in NAD.   HEENT: NCAT, MMM, OP clear, EOMI, , no proptosis or lid retraction  Neck: no thyromegaly or palpable thyroid nodules   Respiratory: lungs CTAB.  Cardiovascular: regular rhythm, normal S1 and S2, no audible murmurs, no peripheral edema  GI: soft, NT/ND, no masses/HSM appreciated.  Neurology: no tremors, DTR 2+  Skin: no visible rashes/lesions  Psychiatric: AAO x 3, normal affect/mood.  Ext: radial pulses intact, DP pulses intact, extremities warm, no cyanosis, clubbing or edema.       LABS:                        12.5   22.8  )-----------( 269      ( 13 Feb 2017 06:03 )             37.9     13 Feb 2017 06:02    144    |  108    |  21     ----------------------------<  90     3.6     |  27     |  0.66     Ca    8.1        13 Feb 2017 06:02  Phos  3.2       13 Feb 2017 06:02  Mg     2.2       13 Feb 2017 06:02          Hemoglobin A1C, Whole Blood: 6.5 (02-10 @ 06:00)    Thyroid Stimulating Hormone, Serum: <0.004 (02-10 @ 06:00)      RADIOLOGY & ADDITIONAL STUDIES:    A/P: 83 y/o female with a hx of hyperthyroidism, asthma ( on prednisone), admitted for asthma exacerbation, found to have suppressed TSH     1. Hyperthyroidism: formally diagnosed 1 week ago, started on Methimazole 10mg q8h. Etiology unknown. Possibly 2/2 autoimmune vs adenoma, however, no mention of structural irregularities on CT scan. Contrast induced possibility however son denies any contrast dye during visit to [facility]. Plan...    2.  Elevated HbA1c: never diagnosed with DM. Elevated FSBG in setting of recent and current prednisone use.   Please continue lispro moderate dose sliding scale four times daily with meals and at bedtime    Pt's fasting glucose and pre-meal goal is < 150     Will continue to monitor       Pt can follow up at discharge with [facility] by calling  to make an appointment.   Will discuss case with Dr. Feldman and update primary team HPI: 83y/o F recently emigrated from [facility] w/ pmhx hyperthyroidism, Asthma ( on prednisone), admitted for management of asthma exacerbation. Upon admission, TSH found to be almost completely suppressed. Endocrine consulted for recommendations of further management of hyperthyroidism. History obtained from son, Radu, who says her mother was diagnosed with Hyperthyroidism 2 years ago in [facility] but was never treated. Methimazole was first started 1 week ago while she was admitted to [facility] for an asthma exacerbation. As per the son, no contrast dye was given at [facility]. Patient has been experiencing weight loss, difficulty sleeping, loss of appetite for the past "few months". He denies any tachycardia or palpitations. There is no family history of thyroid diseases in her family nor any autoimmune diseases. However, there is a + family history of DM. Patient admitted with a HbA1c of 6.5, never on any anti-glycemic medications.     Hx of other regimens  Complications: + N/V   Outpatient Endo: No     PMH & Surgical Hx:ASTHMA EXACERBATION; FLU  Asthma  Influenza  Depression  Anxiety  Hyperthyroidism  Asthma exacerbation  S/P hysterectomy      FH:   DM: YES  Thyroid: NO  Autoimmune: NO   Other: NO    SH:  Smoking: NO   Etoh:  Recreational Drugs:  Social Life:    Home Meds:    Current Meds:  methimazole 10milliGRAM(s) Oral every 8 hours  ALBUTerol/ipratropium for Nebulization 3milliLiter(s) Nebulizer every 4 hours  insulin lispro (HumaLOG) corrective regimen sliding scale  SubCutaneous Before meals and at bedtime  fluticasone / salmeterol 250-50 MICROgram(s) Diskus 1Dose(s) Inhalation two times a day  heparin  Injectable 5000Unit(s) SubCutaneous every 8 hours  predniSONE   Tablet 60milliGRAM(s) Oral daily  LORazepam     Tablet 0.5milliGRAM(s) Oral every 12 hours  sodium chloride 3%  Inhalation 5milliLiter(s) Inhalation once  acetaminophen   Tablet. 650milliGRAM(s) Oral every 6 hours PRN      Allergies:  No Known Allergies      ROS:  Denies the following except as indicated.    General: weight loss/weight gain, decreased appetite, fatigue  Eyes: Blurry vision, double vision, visual changes  ENT: Throat pain, changes in voice,   CV: palpitations, SOB, CP, cough  GI: NVD, difficulty swallowing, abdominal pain  : polyuria, dysuria  Endo: abnormal menses, temperature intolerance, decreased libido  MSK: weakness, joint pain  Skin: rash, dryness, diaphoresis  Heme: Easy bruising,bleeding  Neuro: HA, dizziness, lightheadedness, numbness tingling  Psych: Anxiety, Depression    CAPILLARY BLOOD GLUCOSE  252 (14 Feb 2017 11:02)  93 (14 Feb 2017 06:00)  101 (13 Feb 2017 22:56)  91 (13 Feb 2017 16:00)  257 (13 Feb 2017 12:00)        Height (cm): 154.9 (02-10 @ 06:45)  Weight (kg): 59.8 (02-10 @ 06:45)  BMI (kg/m2): 24.9 (02-10 @ 06:45)    Vital Signs Last 24 Hrs  T(C): 36.9, Max: 36.9 (02-13 @ 18:00)  T(F): 98.4, Max: 98.4 (02-13 @ 18:00)  HR: 98 (78 - 108)  BP: 115/56 (101/56 - 120/62)  BP(mean): 80 (63 - 80)  RR: 16 (16 - 36)  SpO2: 97% (94% - 99%)  Constitutional: wn/wd in NAD.   HEENT: NCAT, MMM, OP clear, EOMI, , no proptosis or lid retraction  Neck: no thyromegaly or palpable thyroid nodules   Respiratory: lungs CTAB.  Cardiovascular: regular rhythm, normal S1 and S2, no audible murmurs, no peripheral edema  GI: soft, NT/ND, no masses/HSM appreciated.  Neurology: no tremors, DTR 2+  Skin: no visible rashes/lesions  Psychiatric: AAO x 3, normal affect/mood.  Ext: radial pulses intact, DP pulses intact, extremities warm, no cyanosis, clubbing or edema.       LABS:                        12.5   22.8  )-----------( 269      ( 13 Feb 2017 06:03 )             37.9     13 Feb 2017 06:02    144    |  108    |  21     ----------------------------<  90     3.6     |  27     |  0.66     Ca    8.1        13 Feb 2017 06:02  Phos  3.2       13 Feb 2017 06:02  Mg     2.2       13 Feb 2017 06:02          Hemoglobin A1C, Whole Blood: 6.5 (02-10 @ 06:00)    Thyroid Stimulating Hormone, Serum: <0.004 (02-10 @ 06:00)      RADIOLOGY & ADDITIONAL STUDIES:    A/P: 81 y/o female with a hx of hyperthyroidism, asthma ( on prednisone), admitted for asthma exacerbation, found to have suppressed TSH     1. Hyperthyroidism: formally diagnosed 1 week ago, started on Methimazole 10mg q8h. TSH <0.004, T3, T4 WNL. Etiology unknown. Possibly 2/2 autoimmune vs adenoma vs contrast induced.   Plan:  - Obtain records from [facility]  - Free T4   - Decrease Methimazole to 10mg BID     2.  Elevated HbA1c: never diagnosed with DM. Elevated FSBG in setting of recent and current prednisone use.   Please continue lispro moderate dose sliding scale four times daily with meals and at bedtime    Pt's fasting glucose and pre-meal goal is < 150     Will continue to monitor       Pt can follow up at discharge with [facility] by calling  to make an appointment.   Will discuss case with Dr. Feldman and update primary team HPI: 81y/o F recently emigrated from [facility] w/ pmhx hyperthyroidism, Asthma ( on prednisone), admitted for management of asthma exacerbation. Upon admission, TSH found to be almost completely suppressed. Endocrine consulted for recommendations of further management of hyperthyroidism. History obtained from son, Radu, who says her mother was diagnosed with Hyperthyroidism 2 years ago in [facility] but was never treated. Methimazole was first started 1 week ago while she was admitted to [facility] for an asthma exacerbation. As per the son, no contrast dye was given at [facility]. Patient has been experiencing weight loss, difficulty sleeping, loss of appetite for the past "few months". He denies any tachycardia or palpitations. There is no family history of thyroid diseases in her family nor any autoimmune diseases. However, there is a + family history of DM. Patient admitted with a HbA1c of 6.5, never on any anti-glycemic medications.     Hx of other regimens  Complications: + N/V   Outpatient Endo: No     PMH & Surgical Hx:ASTHMA EXACERBATION; FLU  Asthma  Influenza  Depression  Anxiety  Hyperthyroidism  Asthma exacerbation  S/P hysterectomy      FH:   DM: YES  Thyroid: NO  Autoimmune: NO   Other: NO    SH:  Smoking: NO   Etoh:  Recreational Drugs:  Social Life:    Home Meds:    Current Meds:  methimazole 10milliGRAM(s) Oral every 8 hours  ALBUTerol/ipratropium for Nebulization 3milliLiter(s) Nebulizer every 4 hours  insulin lispro (HumaLOG) corrective regimen sliding scale  SubCutaneous Before meals and at bedtime  fluticasone / salmeterol 250-50 MICROgram(s) Diskus 1Dose(s) Inhalation two times a day  heparin  Injectable 5000Unit(s) SubCutaneous every 8 hours  predniSONE   Tablet 60milliGRAM(s) Oral daily  LORazepam     Tablet 0.5milliGRAM(s) Oral every 12 hours  sodium chloride 3%  Inhalation 5milliLiter(s) Inhalation once  acetaminophen   Tablet. 650milliGRAM(s) Oral every 6 hours PRN      Allergies:  No Known Allergies      ROS: Unable to obtain as pt does not speak english. However, patients son says she has been experiencing loss of appetite, decreased sleep, weight loss, and nausea/ vomiting for several months     CAPILLARY BLOOD GLUCOSE  252 (14 Feb 2017 11:02)  93 (14 Feb 2017 06:00)  101 (13 Feb 2017 22:56)  91 (13 Feb 2017 16:00)  257 (13 Feb 2017 12:00)        Height (cm): 154.9 (02-10 @ 06:45)  Weight (kg): 59.8 (02-10 @ 06:45)  BMI (kg/m2): 24.9 (02-10 @ 06:45)    Vital Signs Last 24 Hrs  T(C): 36.9, Max: 36.9 (02-13 @ 18:00)  T(F): 98.4, Max: 98.4 (02-13 @ 18:00)  HR: 98 (78 - 108)  BP: 115/56 (101/56 - 120/62)  BP(mean): 80 (63 - 80)  RR: 16 (16 - 36)  SpO2: 97% (94% - 99%)    General: NAD  HEENT: NC/AT; PERRL, clear conjunctiva; MMM  Neck: palpable thyroid. no nodules appreciated.   Respiratory: +wet cough with b/l rhonchi and scant wheezing throughout; no retractions  Cardiovascular: +S1/S2; RRR  Abdomen: soft, NT/ND;   Extremities: WWP, no LE edema  Skin: no acanthosis         LABS:                        12.5   22.8  )-----------( 269      ( 13 Feb 2017 06:03 )             37.9     13 Feb 2017 06:02    144    |  108    |  21     ----------------------------<  90     3.6     |  27     |  0.66     Ca    8.1        13 Feb 2017 06:02  Phos  3.2       13 Feb 2017 06:02  Mg     2.2       13 Feb 2017 06:02          Hemoglobin A1C, Whole Blood: 6.5 (02-10 @ 06:00)    Thyroid Stimulating Hormone, Serum: <0.004 (02-10 @ 06:00)      RADIOLOGY & ADDITIONAL STUDIES:    A/P: 81 y/o female with a hx of hyperthyroidism, asthma ( on prednisone), admitted for asthma exacerbation, found to have suppressed TSH     1. Hyperthyroidism: formally diagnosed 1 week ago, started on Methimazole 10mg q8h. TSH <0.004, T3, T4 WNL. Etiology unknown. Possibly 2/2 autoimmune vs adenoma vs contrast induced.   Plan:  - Obtain records from [facility]  - Free T4   - Decrease Methimazole to 10mg BID     2.  Elevated HbA1c: never diagnosed with DM. Elevated FSBG in setting of recent and current prednisone use.   Please continue lispro moderate dose sliding scale four times daily with meals and at bedtime    Pt's fasting glucose and pre-meal goal is < 150     Will continue to monitor       Pt can follow up at discharge with [facility] by calling  to make an appointment.   Will discuss case with Dr. Feldman and update primary team HPI: 81y/o F recently emigrated from [facility] w/ pmhx hyperthyroidism, Asthma ( on prednisone), admitted for management of asthma exacerbation. Upon admission, TSH found to be almost completely suppressed. Endocrine consulted for recommendations of further management of hyperthyroidism. History obtained from son, Radu, who says her mother was diagnosed with Hyperthyroidism 2 years ago in [facility] but was never treated. Methimazole was first started 1 week ago while she was admitted to [facility] for an asthma exacerbation. As per the son, no contrast dye was given at [facility]. Patient has been experiencing weight loss, difficulty sleeping, loss of appetite for the past "few months". He denies any tachycardia or palpitations. There is no family history of thyroid diseases in her family nor any autoimmune diseases. However, there is a + family history of DM. Patient admitted with a HbA1c of 6.5, never on any anti-glycemic medications.     Hx of other regimens  Complications: + N/V   Outpatient Endo: No     PMH & Surgical Hx:  Asthma  Influenza  Depression  Anxiety  Hyperthyroidism  Asthma exacerbation  S/P hysterectomy      FH:   DM: YES  Thyroid: NO  Autoimmune: NO   Other: NO    SH:  Smoking: NO   Etoh:  Recreational Drugs:  Social Life:    Home Meds:    Current Meds:  methimazole 10milliGRAM(s) Oral every 8 hours  ALBUTerol/ipratropium for Nebulization 3milliLiter(s) Nebulizer every 4 hours  insulin lispro (HumaLOG) corrective regimen sliding scale  SubCutaneous Before meals and at bedtime  fluticasone / salmeterol 250-50 MICROgram(s) Diskus 1Dose(s) Inhalation two times a day  heparin  Injectable 5000Unit(s) SubCutaneous every 8 hours  predniSONE   Tablet 60milliGRAM(s) Oral daily  LORazepam     Tablet 0.5milliGRAM(s) Oral every 12 hours  sodium chloride 3%  Inhalation 5milliLiter(s) Inhalation once  acetaminophen   Tablet. 650milliGRAM(s) Oral every 6 hours PRN      Allergies:  No Known Allergies      ROS: Unable to obtain as pt does not speak english. However, patients son says she has been experiencing loss of appetite, decreased sleep, weight loss, and nausea/ vomiting for several months     CAPILLARY BLOOD GLUCOSE  252 (14 Feb 2017 11:02)  93 (14 Feb 2017 06:00)  101 (13 Feb 2017 22:56)  91 (13 Feb 2017 16:00)  257 (13 Feb 2017 12:00)    Height (cm): 154.9 (02-10 @ 06:45)  Weight (kg): 59.8 (02-10 @ 06:45)  BMI (kg/m2): 24.9 (02-10 @ 06:45)    Vital Signs Last 24 Hrs  T(C): 36.9, Max: 36.9 (02-13 @ 18:00)  T(F): 98.4, Max: 98.4 (02-13 @ 18:00)  HR: 98 (78 - 108)  BP: 115/56 (101/56 - 120/62)  BP(mean): 80 (63 - 80)  RR: 16 (16 - 36)  SpO2: 97% (94% - 99%)    General: NAD  HEENT: NC/AT; PERRL, clear conjunctiva; MMM  Neck: palpable thyroid. no nodules appreciated.   Respiratory: +wet cough with b/l rhonchi and scant wheezing throughout; no retractions  Cardiovascular: +S1/S2; RRR  Abdomen: soft, NT/ND;   Extremities: WWP, no LE edema  Skin: no acanthosis     LABS:                        12.5   22.8  )-----------( 269      ( 13 Feb 2017 06:03 )             37.9     13 Feb 2017 06:02    144    |  108    |  21     ----------------------------<  90     3.6     |  27     |  0.66     Ca    8.1        13 Feb 2017 06:02  Phos  3.2       13 Feb 2017 06:02  Mg     2.2       13 Feb 2017 06:02    Hemoglobin A1C, Whole Blood: 6.5 (02-10 @ 06:00)    Thyroid Stimulating Hormone, Serum: <0.004 (02-10 @ 06:00)    RADIOLOGY & ADDITIONAL STUDIES:    A/P: 83 y/o female with a hx of hyperthyroidism, asthma ( on prednisone), admitted for asthma exacerbation, found to have suppressed TSH     1. Hyperthyroidism: formally diagnosed 1 week ago, started on Methimazole 10mg q8h. TSH <0.004, TT3, TT4 WNL. Etiology unknown. Possibly 2/2 autoimmune vs adenoma vs contrast induced.   Plan:  - Obtain records from [facility]  - Free T4   - Decrease Methimazole to 10mg BID     2.  Elevated HbA1c: never diagnosed with DM. Elevated FSBG in setting of recent and current prednisone use.   Please continue lispro moderate dose sliding scale four times daily with meals and at bedtime.  Please obtain nutrition consult.        Pt's fasting glucose and pre-meal goal is < 180    Will continue to monitor   At discharge, pt may require oral medications for hyperglycemia if she is to continue on steroids.  Would recommend starting metformin 500mg once daily and prandin 1mg three times daily with meals.  Pt should be advised to check her FSG at home 4 times daily with meals and at bedtime. She will likely need diabetes supplies including glucometer, lancets, alchohol pads and test strips.     Pt can follow up at discharge with [facility] by calling  to make an appointment.   Will discuss case with Dr. Feldman and update primary team

## 2017-02-14 NOTE — PROGRESS NOTE ADULT - PROBLEM SELECTOR PLAN 3
Now downtrending, most likely 2/2 corticosteroids vs. less likely infection; pt afebrile since admission  - monitor CBC and for infectious signs/sx

## 2017-02-14 NOTE — PROGRESS NOTE ADULT - PROBLEM SELECTOR PLAN 1
Since admission much improved with bronchodilator therapy and high dose steroids. Unclear what workup she had in the past, but it seems that the recurrent intermittent episodes can not be attributed to her Asthma as she is on adequate treatment.   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 PVCD less likely to be the cause of episodes as she is constantly wheezing and it would not explain the above findings on CT. Anxiety might also play a role in her tachypnea, however would not explain her exam findings.  - pending IgE levels as well as Aspergillous percipitant   - AFB for HOMA (no need for negative pressure isolation as there is no clinical suspicion for TB)  - Agree with steroids  - Agree with Advair as well as Ewelina  - Please attempt to get collateral information from [facility].

## 2017-02-14 NOTE — PROGRESS NOTE ADULT - PROBLEM SELECTOR PLAN 2
per MICU team/hx, pt may not be able to afford medication as outpt  - Endo consulted, Recs appreciated  - Changed from methimazole 10 q8 to  methimazole 10mg q12h  - case mgmt/SW once dispo planning begins for medication affordability

## 2017-02-14 NOTE — PROGRESS NOTE ADULT - PROBLEM SELECTOR PLAN 1
Patient has had recurrent intermittent episodes with tachypnea, increased work of breathing, and distress. Patient has not had desaturation during these events. CT of the chest with significant tree-in-bud pattern, suggestive of small AW disease.  - Pulm following, recs appreciated.     - f/u - IgE levels as well as Aspergillous percipitant     - f/u AFB for HOMA (no need for negative pressure isolation as there is no clinical suspicion for TB)    - Continue - prednisone 60mg PO daily to stop after 5 day course (on 2/16)  - advair 250/50 BID  - duonebs q4h standing  - ativan 0.5mg IVP BID PRN for anxiety  - supplemental O2 via 2L NC with low threshold to resume BiPAP if respiratory distress

## 2017-02-14 NOTE — PROGRESS NOTE ADULT - PROBLEM SELECTOR PLAN 4
While patient is sleeping, she does not have tachypnea, unclear if there is an element of anxiety involved. When patient is questioned about anxiety she denies it, but as per  on phone she has fast hurried speech pattern.

## 2017-02-14 NOTE — PROGRESS NOTE ADULT - SUBJECTIVE AND OBJECTIVE BOX
Interval Events:  Patient seen and examined at bedside. This mornign was placed on HFNC as noted to be more tachypneic. When examined seems to be at her baseline.    MEDICATIONS:  Pulmonary:  ALBUTerol/ipratropium for Nebulization 3milliLiter(s) Nebulizer every 4 hours  fluticasone / salmeterol 250-50 MICROgram(s) Diskus 1Dose(s) Inhalation two times a day  sodium chloride 3%  Inhalation 5milliLiter(s) Inhalation once    Antimicrobials:    Anticoagulants:  heparin  Injectable 5000Unit(s) SubCutaneous every 8 hours    Cardiac:      Allergies    No Known Allergies    Intolerances        Vital Signs Last 24 Hrs  T(C): 36.8, Max: 36.9 (02-13 @ 18:00)  T(F): 98.3, Max: 98.4 (02-13 @ 18:00)  HR: 87 (78 - 98)  BP: 138/71 (104/53 - 138/71)  BP(mean): 80 (63 - 80)  RR: 22 (16 - 34)  SpO2: 99% (94% - 100%)        PHYSICAL EXAM:  General: Well developed; well nourished; in no acute distress  HEENT: PERRL, EOMI, non icteric  Neck: Supple;  Respiratory: good BS b/l, diffuse rhonchi, scattered expiratory wheezing   Cardiovascular: Regular rate and rhythm. S1 and S2 Normal; No murmurs  Gastrointestinal: Soft non-tender non-distended;   Extremities: WWP, no edema, no cyanosis  Neurological: Alert and oriented x3  Skin: Warm and dry. No obvious rash    LABS:      CBC Full  -  ( 14 Feb 2017 11:45 )  WBC Count : 13.4 K/uL  Hemoglobin : 12.7 g/dL  Hematocrit : 38.4 %  Platelet Count - Automated : 272 K/uL  Mean Cell Volume : 89.3 fL  Mean Cell Hemoglobin : 29.5 pg  Mean Cell Hemoglobin Concentration : 33.1 g/dL  Auto Neutrophil # : x  Auto Lymphocyte # : x  Auto Monocyte # : x  Auto Eosinophil # : x  Auto Basophil # : x  Auto Neutrophil % : x  Auto Lymphocyte % : x  Auto Monocyte % : x  Auto Eosinophil % : x  Auto Basophil % : x    14 Feb 2017 11:45    136    |  103    |  21     ----------------------------<  284    4.5     |  29     |  0.73     Ca    8.5        14 Feb 2017 11:45  Phos  2.4       14 Feb 2017 11:45  Mg     2.4       14 Feb 2017 11:45    TPro  7.1    /  Alb  2.8    /  TBili  0.6    /  DBili  x      /  AST  19     /  ALT  39     /  AlkPhos  69     14 Feb 2017 11:45                      RADIOLOGY imaging reviewed

## 2017-02-15 LAB
ANION GAP SERPL CALC-SCNC: 9 MMOL/L — SIGNIFICANT CHANGE UP (ref 9–16)
BUN SERPL-MCNC: 27 MG/DL — HIGH (ref 7–23)
CALCIUM SERPL-MCNC: 8.8 MG/DL — SIGNIFICANT CHANGE UP (ref 8.5–10.5)
CHLORIDE SERPL-SCNC: 112 MMOL/L — HIGH (ref 96–108)
CO2 SERPL-SCNC: 23 MMOL/L — SIGNIFICANT CHANGE UP (ref 22–31)
CREAT SERPL-MCNC: 0.8 MG/DL — SIGNIFICANT CHANGE UP (ref 0.5–1.3)
GLUCOSE SERPL-MCNC: 120 MG/DL — HIGH (ref 70–99)
HCT VFR BLD CALC: 38.9 % — SIGNIFICANT CHANGE UP (ref 34.5–45)
HGB BLD-MCNC: 12.7 G/DL — SIGNIFICANT CHANGE UP (ref 11.5–15.5)
LYMPHOCYTES # BLD AUTO: 24 % — SIGNIFICANT CHANGE UP (ref 13–44)
MAGNESIUM SERPL-MCNC: 2.6 MG/DL — HIGH (ref 1.6–2.4)
MCHC RBC-ENTMCNC: 28.9 PG — SIGNIFICANT CHANGE UP (ref 27–34)
MCHC RBC-ENTMCNC: 32.6 G/DL — SIGNIFICANT CHANGE UP (ref 32–36)
MCV RBC AUTO: 88.4 FL — SIGNIFICANT CHANGE UP (ref 80–100)
MONOCYTES NFR BLD AUTO: 3 % — SIGNIFICANT CHANGE UP (ref 2–14)
NEUTROPHILS NFR BLD AUTO: 69 % — SIGNIFICANT CHANGE UP (ref 43–77)
NIGHT BLUE STAIN TISS: SIGNIFICANT CHANGE UP
PHOSPHATE SERPL-MCNC: 4.5 MG/DL — SIGNIFICANT CHANGE UP (ref 2.5–4.5)
PLATELET # BLD AUTO: 284 K/UL — SIGNIFICANT CHANGE UP (ref 150–400)
POTASSIUM SERPL-MCNC: 3.7 MMOL/L — SIGNIFICANT CHANGE UP (ref 3.5–5.3)
POTASSIUM SERPL-SCNC: 3.7 MMOL/L — SIGNIFICANT CHANGE UP (ref 3.5–5.3)
RBC # BLD: 4.4 M/UL — SIGNIFICANT CHANGE UP (ref 3.8–5.2)
RBC # FLD: 12.9 % — SIGNIFICANT CHANGE UP (ref 10.3–16.9)
SODIUM SERPL-SCNC: 144 MMOL/L — SIGNIFICANT CHANGE UP (ref 135–145)
SPECIMEN SOURCE: SIGNIFICANT CHANGE UP
WBC # BLD: 18.8 K/UL — HIGH (ref 3.8–10.5)
WBC # FLD AUTO: 18.8 K/UL — HIGH (ref 3.8–10.5)

## 2017-02-15 PROCEDURE — 99233 SBSQ HOSP IP/OBS HIGH 50: CPT | Mod: GC

## 2017-02-15 RX ORDER — POTASSIUM CHLORIDE 20 MEQ
40 PACKET (EA) ORAL ONCE
Qty: 0 | Refills: 0 | Status: COMPLETED | OUTPATIENT
Start: 2017-02-15 | End: 2017-02-15

## 2017-02-15 RX ADMIN — Medication 40 MILLIEQUIVALENT(S): at 07:35

## 2017-02-15 RX ADMIN — HEPARIN SODIUM 5000 UNIT(S): 5000 INJECTION INTRAVENOUS; SUBCUTANEOUS at 06:21

## 2017-02-15 RX ADMIN — Medication 4: at 07:35

## 2017-02-15 RX ADMIN — HEPARIN SODIUM 5000 UNIT(S): 5000 INJECTION INTRAVENOUS; SUBCUTANEOUS at 13:58

## 2017-02-15 RX ADMIN — Medication 6: at 11:45

## 2017-02-15 RX ADMIN — Medication 4: at 21:58

## 2017-02-15 RX ADMIN — FLUTICASONE PROPIONATE AND SALMETEROL 1 DOSE(S): 50; 250 POWDER ORAL; RESPIRATORY (INHALATION) at 18:05

## 2017-02-15 RX ADMIN — Medication 0.5 MILLIGRAM(S): at 11:45

## 2017-02-15 RX ADMIN — Medication 3 MILLILITER(S): at 11:45

## 2017-02-15 RX ADMIN — Medication 60 MILLIGRAM(S): at 06:22

## 2017-02-15 RX ADMIN — Medication 3 MILLILITER(S): at 18:02

## 2017-02-15 RX ADMIN — Medication 3 MILLILITER(S): at 13:58

## 2017-02-15 RX ADMIN — Medication 3 MILLILITER(S): at 21:55

## 2017-02-15 RX ADMIN — Medication 3 MILLILITER(S): at 02:04

## 2017-02-15 RX ADMIN — Medication 3 MILLILITER(S): at 06:21

## 2017-02-15 RX ADMIN — FLUTICASONE PROPIONATE AND SALMETEROL 1 DOSE(S): 50; 250 POWDER ORAL; RESPIRATORY (INHALATION) at 06:37

## 2017-02-15 RX ADMIN — HEPARIN SODIUM 5000 UNIT(S): 5000 INJECTION INTRAVENOUS; SUBCUTANEOUS at 21:55

## 2017-02-15 RX ADMIN — Medication 6: at 18:08

## 2017-02-15 NOTE — PROGRESS NOTE ADULT - SUBJECTIVE AND OBJECTIVE BOX
Interval Events:  Patient seen and examined at bedside. No significant change since yesterday, more alert    MEDICATIONS:  Pulmonary:  ALBUTerol/ipratropium for Nebulization 3milliLiter(s) Nebulizer every 4 hours  fluticasone / salmeterol 250-50 MICROgram(s) Diskus 1Dose(s) Inhalation two times a day  sodium chloride 3%  Inhalation 5milliLiter(s) Inhalation once    Antimicrobials:    Anticoagulants:  heparin  Injectable 5000Unit(s) SubCutaneous every 8 hours    Cardiac:      Allergies    No Known Allergies    Intolerances        Vital Signs Last 24 Hrs  T(C): 36.5, Max: 36.9 (02-14 @ 17:38)  T(F): 97.7, Max: 98.4 (02-14 @ 17:38)  HR: 98 (82 - 108)  BP: 114/59 (107/55 - 129/60)  BP(mean): 79 (73 - 86)  RR: 18 (18 - 24)  SpO2: 99% (97% - 100%)    I & Os for current day (as of 02-15 @ 17:22)  =============================================  IN: 0 ml / OUT: 300 ml / NET: -300 ml        PHYSICAL EXAM:  General: Well developed; well nourished; in no acute distress  HEENT: PERRL, EOMI, non icteric  Neck: Supple;   Respiratory: good BS b/l, mild scattered rhonchi (improved) and mild diffused exp wheezing  Cardiovascular: Regular rate and rhythm. S1 and S2 Normal; No murmurs  Gastrointestinal: Soft non-tender non-distended;  Extremities: WWP, no edema, no cyanosis  Neurological: Alert and oriented  Skin: Warm and dry. No obvious rash    LABS:      CBC Full  -  ( 15 Feb 2017 06:30 )  WBC Count : 18.8 K/uL  Hemoglobin : 12.7 g/dL  Hematocrit : 38.9 %  Platelet Count - Automated : 284 K/uL  Mean Cell Volume : 88.4 fL  Mean Cell Hemoglobin : 28.9 pg  Mean Cell Hemoglobin Concentration : 32.6 g/dL  Auto Neutrophil # : x  Auto Lymphocyte # : x  Auto Monocyte # : x  Auto Eosinophil # : x  Auto Basophil # : x  Auto Neutrophil % : 69.0 %  Auto Lymphocyte % : 24.0 %  Auto Monocyte % : 3.0 %  Auto Eosinophil % : x  Auto Basophil % : x    15 Feb 2017 06:30    144    |  112    |  27     ----------------------------<  120    3.7     |  23     |  0.80     Ca    8.8        15 Feb 2017 06:30  Phos  4.5       15 Feb 2017 06:30  Mg     2.6       15 Feb 2017 06:30    TPro  7.1    /  Alb  2.8    /  TBili  0.6    /  DBili  x      /  AST  19     /  ALT  39     /  AlkPhos  69     14 Feb 2017 11:45                      RADIOLOGY imaging reviewed

## 2017-02-15 NOTE — PROGRESS NOTE ADULT - SUBJECTIVE AND OBJECTIVE BOX
TRANSFER ACCEPT NOTE:    Hospital course: Patient is an 83yo F recently emigrated from [facility] (11/2016) w/ PMH hyperthyroidism, 25 year hx of asthma (w/ recent ER visit at [facility] for exacerbation w/ +flu, received albuterol/prednisone/tamiflu, d/c-ed on theophylline; no hx of intubations). Pt was unable to fill her meds 2/2 insurance issues and then presented to [facility] 2/9 w/ worsening dyspnea, cough, and unable to minimally ambulate w/o shortness of breath w/ 3 additional hospitalizations for asthma this past year. In the ED patient had respiratory distress and was placed on BiPAP. She began intermittently taking off mask and ambulating at which time she became progressively more dyspneic w/ sat coming down to 95% on RA off BiPAP and subsequently RR up to 40, and was admitted to the ICU. Pt was admitted to the ICU from 2/10-2/13. While in the ICU pt was treated for an asthma exacerbation with Prednisone, Advair, Duonebs. Ativan 0.5 BID was added on with suspicion that there was a component of anxiety triggering asthma. On 2/12 AM pt was tachypneic after exertion; placed on BiPAP with improvement. Pt did not have further need for BiPAP after this episode. However pt continued to be tachypneic during ICU visit. Pt had further workup w/ CT chest demonstrated findings consistent with multifocal moderate infectious/inflammatory bronchiolitis. Pulm consulted given appropriate treatment and lack of clinical improvement as pt still tachypneic. Went for echo, EF60% w/ mild impaired LV relaxation. Pt stepped down to [facility]. While in [facility] patient found to elevated IgE at 488. Patient was placed on HFNC intermittently for increased work of breathing, however patient never had desaturation. Endocrine consulted for history of hyperthyroid, and ordered for ultrasound of thyroid. Otherwise, VSS and patient is hemodynamically stable for step down to regional medical floor. Considering patient's immigration history, pending issues are access to medications on discharge, diagnosis of underlying lung pathology and proper treatment, diagnosis of possible underlying anxiety, and care coordination/ management.       VITAL SIGNS:  T(F): 98.3  HR: 98  BP: 127/59  RR: 18  SpO2: 99%  Wt(kg): --    PHYSICAL EXAM:    General: Pt looks younger than stated age, NAD  HEENT: PERRL/ EOMI, no ptosis, MMM, no thyromegaly or nodules palpated  Respiratory: Diffuse rhonchi and expiratory wheezing b/l, RR 16. Patient is able to speak in full sentences.  Cardiovascular: RRR, +S1 + S2, no m/r/g  Abdomen: Soft, NTND, normoactive bowel sounds, no rebound, no guarding, no suprapubic tenderness  Extremities: No cyanosis, no clubbing, no edema, pulses equal, no calf tenderness  Lymphatic: No cervical/supraclavicular LAD      MEDICATIONS  (STANDING):  ALBUTerol/ipratropium for Nebulization 3milliLiter(s) Nebulizer every 4 hours  insulin lispro (HumaLOG) corrective regimen sliding scale  SubCutaneous Before meals and at bedtime  fluticasone / salmeterol 250-50 MICROgram(s) Diskus 1Dose(s) Inhalation two times a day  heparin  Injectable 5000Unit(s) SubCutaneous every 8 hours  LORazepam     Tablet 0.5milliGRAM(s) Oral every 12 hours  sodium chloride 3%  Inhalation 5milliLiter(s) Inhalation once  methimazole 10milliGRAM(s) Oral every 12 hours    MEDICATIONS  (PRN):  acetaminophen   Tablet. 650milliGRAM(s) Oral every 6 hours PRN Moderate Pain (4 - 6)      Allergies    No Known Allergies    Intolerances        LABS:                        12.7   18.8  )-----------( 284      ( 15 Feb 2017 06:30 )             38.9     15 Feb 2017 06:30    144    |  112    |  27     ----------------------------<  120    3.7     |  23     |  0.80     Ca    8.8        15 Feb 2017 06:30  Phos  4.5       15 Feb 2017 06:30  Mg     2.6       15 Feb 2017 06:30    TPro  7.1    /  Alb  2.8    /  TBili  0.6    /  DBili  x      /  AST  19     /  ALT  39     /  AlkPhos  69     14 Feb 2017 11:45          RADIOLOGY & ADDITIONAL TESTS:

## 2017-02-15 NOTE — PROGRESS NOTE ADULT - PROBLEM SELECTOR PLAN 6
Replete lytes to keep K>4 and Mg>2  Regular diet    Dispo: To be stepped down to [facility] for continued care/management  FULL CODE

## 2017-02-15 NOTE — PROGRESS NOTE ADULT - SUBJECTIVE AND OBJECTIVE BOX
INTERVAL HPI/OVERNIGHT EVENTS:    Patient is a 82y old  Female who presents with a chief complaint of dyspnea, likely related to HOMA vs aspergillosis with finding of hyperthyroidism.     Pt is not able to participate in interview    MEDICATIONS  (STANDING):  ALBUTerol/ipratropium for Nebulization 3milliLiter(s) Nebulizer every 4 hours  insulin lispro (HumaLOG) corrective regimen sliding scale  SubCutaneous Before meals and at bedtime  fluticasone / salmeterol 250-50 MICROgram(s) Diskus 1Dose(s) Inhalation two times a day  heparin  Injectable 5000Unit(s) SubCutaneous every 8 hours  predniSONE   Tablet 60milliGRAM(s) Oral daily  LORazepam     Tablet 0.5milliGRAM(s) Oral every 12 hours  sodium chloride 3%  Inhalation 5milliLiter(s) Inhalation once  methimazole 10milliGRAM(s) Oral every 12 hours    MEDICATIONS  (PRN):  acetaminophen   Tablet. 650milliGRAM(s) Oral every 6 hours PRN Moderate Pain (4 - 6)      PHYSICAL EXAM  Vital Signs Last 24 Hrs  T(C): 36.7, Max: 36.9 (02-14 @ 17:38)  T(F): 98.1, Max: 98.4 (02-14 @ 17:38)  HR: 94 (82 - 108)  BP: 127/59 (107/55 - 129/60)  BP(mean): 85 (73 - 86)  RR: 18 (18 - 24)  SpO2: 98% (97% - 100%)    Constitutional: wn/wd in NAD.   HEENT: NCAT, MMM, OP clear, EOMI, , no proptosis or lid retraction  Neck: no thyromegaly or palpable thyroid nodules   Respiratory: lungs CTAB.  Cardiovascular: regular rhythm, normal S1 and S2, no audible murmurs, no peripheral edema  GI: soft, NT/ND, no masses/HSM appreciated.  Neurology: no tremors, DTR 2+  Skin: no visible rashes/lesions  Psychiatric: AAO x 3, normal affect/mood.    LABS:                        12.7   18.8  )-----------( 284      ( 15 Feb 2017 06:30 )             38.9     15 Feb 2017 06:30    144    |  112    |  27     ----------------------------<  120    3.7     |  23     |  0.80     Ca    8.8        15 Feb 2017 06:30  Phos  4.5       15 Feb 2017 06:30  Mg     2.6       15 Feb 2017 06:30    TPro  7.1    /  Alb  2.8    /  TBili  0.6    /  DBili  x      /  AST  19     /  ALT  39     /  AlkPhos  69     14 Feb 2017 11:45    Thyroid Stimulating Hormone, Serum: <0.004 uIU/mL (02-10 @ 06:00)    HbA1C: 6.5 % (02-10 @ 06:00)    CAPILLARY BLOOD GLUCOSE  295 (15 Feb 2017 11:34)  207 (15 Feb 2017 07:29)  85 (14 Feb 2017 22:00)  250 (14 Feb 2017 16:05)      A/P: 82y Female with history of hyperthyroidism presenting for SOB with concern for infectious cause.     1.  Hyperthyroid - pt dx 2 years ago in [facility], not previously on medication.  Family denies recent contrast studies at OSH.  Please check thyroid US and please continue methimazole 10mg twice daily.  Please repeat FT4 on 2/18.  Check CBC every week for evidence of agranulocytosis 2/2 to methimazole and LFTs once weekly.      Pt can follow up at discharge with [facility] by calling  to make an appointment.   Will discuss case with Dr. Feldman   and update primary team

## 2017-02-15 NOTE — PROGRESS NOTE ADULT - PROBLEM SELECTOR PLAN 1
Patient has had recurrent intermittent episodes with tachypnea, increased work of breathing, and distress. Patient has not had desaturation during these events. CT of the chest with significant tree-in-bud pattern, suggestive of small AW disease.  - Pulm following, recs appreciated.     - f/u - IgE levels high.      - Aspergillous percipitant negative    - f/u AFB for HOMA (no need for negative pressure isolation as there is no clinical suspicion for TB)    - Continue - prednisone 60mg PO daily to stop after 5 day course (on 2/16) - considering elevated IgE, may warrant extended course, will speak with pulmonology.   - advair 250/50 BID  - duonebs q4h standing  - ativan 0.5mg IVP BID PRN for anxiety  - supplemental O2 via 2L NC with low threshold to resume BiPAP if respiratory distress

## 2017-02-15 NOTE — PROGRESS NOTE ADULT - PROBLEM SELECTOR PLAN 2
per MICU team/hx, pt may not be able to afford medication as outpt  - Endo consulted, Recs appreciated  - Changed from methimazole 10 q8 to  methimazole 10mg q12h  - Thyroid U/S pending  - case mgmt/SW once dispo planning begins for medication affordability

## 2017-02-15 NOTE — PROGRESS NOTE ADULT - PROBLEM SELECTOR PLAN 3
most likely 2/2 corticosteroids, no signs of infection, pt afebrile since admission  - monitor CBC and for infectious signs/sx  - full fever work up if febrile most likely 2/2 corticosteroids, no signs of infection, pt afebrile since admission  - monitor for infectious signs/sx  - full fever work up if febrile

## 2017-02-15 NOTE — PROGRESS NOTE ADULT - PROBLEM SELECTOR PLAN 1
Patient has had recurrent intermittent episodes with tachypnea, increased work of breathing, and distress. Patient has not had desaturation during these events. CT of the chest shows consistent with multifocal moderate infectious/inflammatory. IgE elevated to 488.  bronchiolitis.  - Pulm following, recs appreciated.   - f/u Aspergillous Antibody  - f/u AFB for HOMA (no need for negative pressure isolation as there is no clinical suspicion for TB)  - Continue prednisone 60mg PO daily to stop after 5 day course (on 2/16)  - Continue Advair 250/50 BID  - duonebs q4h standing  - ativan 0.5mg IVP BID PRN for anxiety  - supplemental O2 via 2L NC with low threshold to resume BiPAP if respiratory distress

## 2017-02-15 NOTE — PROGRESS NOTE ADULT - PROBLEM SELECTOR PLAN 6
Replete lytes to keep K>4 and Mg>2  Regular diet    Dispo: Case mgmt/SW for medication affordability    FULL CODE

## 2017-02-15 NOTE — PROGRESS NOTE ADULT - PROBLEM SELECTOR PLAN 1
likely exacerbation in setting of influenza in a poorly controlled asthmatic.  Since admission much improved with bronchodilator therapy and high dose steroids. Started on Tamiflu as it appears she did not complete the course at [facility] 2 weeks ago.   CT of the chest with significant tree-in-bud pattern, suggestive of small AW disease - might all be explained by recent Influenza infection, however could also be ABPA in setting of elevated IgE vs HOMA, ABPA.   - pending  Aspergillous percipitant   - AFB for HOMA (no need for negative pressure isolation as there is no clinical suspicion for TB)  - C/W with steroids - can taper down to Prednisone 40mg daily - will need a long course.  - Agree with Advair as well as Ewelina

## 2017-02-15 NOTE — PROGRESS NOTE ADULT - PROBLEM SELECTOR PLAN 4
While patient is sleeping, she does not have tachypnea, unclear if there is an element of anxiety involved. When patient is questioned about anxiety she denies it, but as per  on phone she has fast hurried speech pattern.  - Patient again has signs of anxiety, but again denies anxiety or depression.   - As per son, patient has had some symptoms of delerium and confusion. It is possible that ativan has a contributing factor, may want to stop standing ativan and start low dose Seroquel instead. will discuss with team.

## 2017-02-15 NOTE — PROGRESS NOTE ADULT - PROBLEM SELECTOR PLAN 2
per MICU team/hx, pt may not be able to afford medication as outpt  - Endo consulted, Recs appreciated  - C/w methimazole 10mg q12h  - Check CBC every week for evidence of agranulocytosis 2/2 to methimazole and LFTs once weekly.   - f/u thyroid US

## 2017-02-16 DIAGNOSIS — J96.01 ACUTE RESPIRATORY FAILURE WITH HYPOXIA: ICD-10-CM

## 2017-02-16 LAB
ANION GAP SERPL CALC-SCNC: 5 MMOL/L — LOW (ref 9–16)
BUN SERPL-MCNC: 21 MG/DL — SIGNIFICANT CHANGE UP (ref 7–23)
CALCIUM SERPL-MCNC: 8.5 MG/DL — SIGNIFICANT CHANGE UP (ref 8.5–10.5)
CHLORIDE SERPL-SCNC: 112 MMOL/L — HIGH (ref 96–108)
CO2 SERPL-SCNC: 27 MMOL/L — SIGNIFICANT CHANGE UP (ref 22–31)
CREAT SERPL-MCNC: 0.72 MG/DL — SIGNIFICANT CHANGE UP (ref 0.5–1.3)
EOSINOPHIL # BLD: 100 /UL — SIGNIFICANT CHANGE UP (ref 50–350)
GLUCOSE SERPL-MCNC: 101 MG/DL — HIGH (ref 70–99)
MAGNESIUM SERPL-MCNC: 2.2 MG/DL — SIGNIFICANT CHANGE UP (ref 1.6–2.4)
POTASSIUM SERPL-MCNC: 3.9 MMOL/L — SIGNIFICANT CHANGE UP (ref 3.5–5.3)
POTASSIUM SERPL-SCNC: 3.9 MMOL/L — SIGNIFICANT CHANGE UP (ref 3.5–5.3)
SODIUM SERPL-SCNC: 144 MMOL/L — SIGNIFICANT CHANGE UP (ref 135–145)

## 2017-02-16 PROCEDURE — 99232 SBSQ HOSP IP/OBS MODERATE 35: CPT

## 2017-02-16 PROCEDURE — 76536 US EXAM OF HEAD AND NECK: CPT | Mod: 26

## 2017-02-16 PROCEDURE — 99233 SBSQ HOSP IP/OBS HIGH 50: CPT

## 2017-02-16 RX ORDER — BUDESONIDE, MICRONIZED 100 %
0.25 POWDER (GRAM) MISCELLANEOUS
Qty: 0 | Refills: 0 | Status: DISCONTINUED | OUTPATIENT
Start: 2017-02-16 | End: 2017-02-16

## 2017-02-16 RX ORDER — BUDESONIDE, MICRONIZED 100 %
0.25 POWDER (GRAM) MISCELLANEOUS
Qty: 0 | Refills: 0 | Status: DISCONTINUED | OUTPATIENT
Start: 2017-02-16 | End: 2017-02-21

## 2017-02-16 RX ORDER — POTASSIUM CHLORIDE 20 MEQ
20 PACKET (EA) ORAL ONCE
Qty: 0 | Refills: 0 | Status: COMPLETED | OUTPATIENT
Start: 2017-02-16 | End: 2017-02-16

## 2017-02-16 RX ADMIN — Medication 3 MILLILITER(S): at 16:04

## 2017-02-16 RX ADMIN — Medication 0: at 22:08

## 2017-02-16 RX ADMIN — Medication 3 MILLILITER(S): at 22:07

## 2017-02-16 RX ADMIN — FLUTICASONE PROPIONATE AND SALMETEROL 1 DOSE(S): 50; 250 POWDER ORAL; RESPIRATORY (INHALATION) at 06:04

## 2017-02-16 RX ADMIN — Medication 3 MILLILITER(S): at 11:40

## 2017-02-16 RX ADMIN — HEPARIN SODIUM 5000 UNIT(S): 5000 INJECTION INTRAVENOUS; SUBCUTANEOUS at 06:04

## 2017-02-16 RX ADMIN — HEPARIN SODIUM 5000 UNIT(S): 5000 INJECTION INTRAVENOUS; SUBCUTANEOUS at 13:00

## 2017-02-16 RX ADMIN — Medication 3 MILLILITER(S): at 01:01

## 2017-02-16 RX ADMIN — Medication 3 MILLILITER(S): at 06:04

## 2017-02-16 RX ADMIN — Medication 0.25 MILLIGRAM(S): at 17:31

## 2017-02-16 RX ADMIN — Medication 3 MILLILITER(S): at 19:01

## 2017-02-16 RX ADMIN — Medication 4: at 17:30

## 2017-02-16 RX ADMIN — Medication 20 MILLIEQUIVALENT(S): at 07:30

## 2017-02-16 RX ADMIN — Medication 2: at 13:00

## 2017-02-16 RX ADMIN — HEPARIN SODIUM 5000 UNIT(S): 5000 INJECTION INTRAVENOUS; SUBCUTANEOUS at 22:07

## 2017-02-16 RX ADMIN — Medication 60 MILLIGRAM(S): at 06:05

## 2017-02-16 NOTE — DISCHARGE NOTE ADULT - CARE PLAN
Principal Discharge DX:	Asthma exacerbation  Instructions for follow-up, activity and diet:	You were admitted for asthma exacerbation. You were  Secondary Diagnosis:	Hyperthyroidism  Instructions for follow-up, activity and diet:	You have hyperthyroidism. The ultrasound of your thyroid shows . Please continue taking methimazole 10mg by mouth 2 times a day and follow up with Principal Discharge DX:	Asthma exacerbation  Instructions for follow-up, activity and diet:	You were admitted for asthma exacerbation. You were admitted to the ICU and put on non-invasive pressure support mask, then  Secondary Diagnosis:	Hyperthyroidism  Instructions for follow-up, activity and diet:	You have hyperthyroidism. The ultrasound of your thyroid shows . Please continue taking methimazole 10mg by mouth 2 times a day and follow up with Principal Discharge DX:	Asthma exacerbation  Goal:	acute exacerbation resolved  Instructions for follow-up, activity and diet:	You were admitted for asthma exacerbation. You were admitted to the ICU and put on non-invasive pressure support mask, then  Secondary Diagnosis:	Hyperthyroidism  Instructions for follow-up, activity and diet:	You have hyperthyroidism. The ultrasound of your thyroid shows multiple nodules that will need further workup including Radioactive iodine uptake test. Please continue taking methimazole 10mg by mouth 2 times a day. While you are taking methimazole, you will need to check your blood test every week including CBC for evidence of agranulocytosis and CMP to check for LFTs. You will need to follow up with [facility] within 1 week by calling . Principal Discharge DX:	Asthma exacerbation  Goal:	acute exacerbation resolved  Instructions for follow-up, activity and diet:	You were admitted for asthma exacerbation. You were admitted to the ICU and put on non-invasive pressure support mask, then Hifh flow nasal cannula. You were also treated with steroids, albuterol/ipratropium inhaler, and pulmicort. Please continue taking prednisone 40mg (2 tablets of 20mg), albuterol/ipratropium inhaler every 4 hr and Pulmicort 2 times a day and follow up with Jarrett Larsen [phone] at [address] [place] [address] for medication adjustment and full pulmonary function test  Secondary Diagnosis:	Hyperthyroidism  Instructions for follow-up, activity and diet:	You have hyperthyroidism. The ultrasound of your thyroid shows multiple nodules that will need further workup including Radioactive iodine uptake test. Please continue taking methimazole 10mg by mouth 2 times a day. While you are taking methimazole, you will need to check your blood test every week including CBC for evidence of agranulocytosis and CMP to check for LFTs to monitor for liver toxicity. You will need to follow up with [facility] within 1 week by calling .  Secondary Diagnosis:	Anxiety  Instructions for follow-up, activity and diet:	You were evaluated by the Psychiatry service for anxiety. 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 No medications are recommended at this point. Please seek further evaluation by a psychiatrist if you continue to experience anxiety. Principal Discharge DX:	Asthma exacerbation  Goal:	acute exacerbation resolved  Instructions for follow-up, activity and diet:	You were admitted for asthma exacerbation. You were admitted to the ICU and put on non-invasive pressure support mask, then High flow nasal cannula. You were also treated with steroids, albuterol/ipratropium inhaler, and pulmicort. Please continue taking prednisone 40mg (2 tablets of 20mg), albuterol/ipratropium inhaler every 4 hr and Pulmicort 2 times a day and follow up with Jarrett Lasren [phone] at [address] [place] [address] for medication adjustment and full pulmonary function test  Secondary Diagnosis:	Hyperthyroidism  Instructions for follow-up, activity and diet:	You have hyperthyroidism. The ultrasound of your thyroid shows multiple nodules that will need further workup including Radioactive iodine uptake test. Please continue taking methimazole 10mg by mouth 2 times a day. While you are taking methimazole, you will need to check your blood test every week including CBC for evidence of agranulocytosis and CMP to check for LFTs to monitor for liver toxicity. You will need to follow up with [facility] within 1 week by calling .  Secondary Diagnosis:	Anxiety  Instructions for follow-up, activity and diet:	You were evaluated by the Psychiatry service for anxiety. 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 No medications are recommended at this point. Please seek further evaluation by a psychiatrist if you continue to experience anxiety. Principal Discharge DX:	Asthma exacerbation  Goal:	acute exacerbation resolved  Instructions for follow-up, activity and diet:	You were admitted for asthma exacerbation. You were admitted to the ICU and put on non-invasive pressure support mask, then High flow nasal cannula. You were also treated with steroids, albuterol/ipratropium inhaler, and pulmicort. Please continue taking prednisone 40mg (2 tablets of 20mg), albuterol/ipratropium inhaler every 4 hr and Pulmicort 2 times a day and follow up with Jarrett Larsen [phone] at [address] [place] [address] for medication adjustment and full pulmonary function test  Secondary Diagnosis:	Hyperthyroidism  Instructions for follow-up, activity and diet:	You have hyperthyroidism. The ultrasound of your thyroid shows multiple nodules that will need further workup including Radioactive iodine uptake test. Please continue taking methimazole 10mg by mouth 2 times a day. While you are taking methimazole, you will need to check your blood test every week including CBC for evidence of agranulocytosis and CMP to check for LFTs to monitor for liver toxicity. You will need to follow up with [facility] within 1 week by calling .  Secondary Diagnosis:	Anxiety  Instructions for follow-up, activity and diet:	You were evaluated by the Psychiatry service for anxiety. 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 No medications are recommended at this point. Please seek further evaluation by a psychiatrist if you continue to experience anxiety. Principal Discharge DX:	Asthma exacerbation  Goal:	acute exacerbation resolved  Instructions for follow-up, activity and diet:	You were admitted for asthma exacerbation. You were admitted to the ICU and put on non-invasive pressure support mask, then High flow nasal cannula. You were also treated with steroids, albuterol/ipratropium inhaler, and pulmicort. Please continue taking prednisone 40mg (2 tablets of 20mg), albuterol/ipratropium inhaler every 4 hr and Pulmicort 2 times a day and follow up with Jarrett Larsen [phone] at [address] [place] [address] for medication adjustment and full pulmonary function test  Secondary Diagnosis:	Hyperthyroidism  Instructions for follow-up, activity and diet:	You have hyperthyroidism. The ultrasound of your thyroid shows multiple nodules that will need further workup including Radioactive iodine uptake test. Please continue taking methimazole 10mg by mouth 2 times a day. While you are taking methimazole, you will need to check your blood test every week including CBC for evidence of agranulocytosis and CMP to check for LFTs to monitor for liver toxicity. You will need to follow up with [facility] within 1 week by calling .  Secondary Diagnosis:	Anxiety  Instructions for follow-up, activity and diet:	You were evaluated by the Psychiatry service for anxiety. 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 No medications are recommended at this point. Please seek further evaluation by a psychiatrist if you continue to experience anxiety. Principal Discharge DX:	Asthma exacerbation  Goal:	acute exacerbation resolved  Instructions for follow-up, activity and diet:	You were admitted for asthma exacerbation. You were admitted to the ICU and put on non-invasive pressure support mask, then High flow nasal cannula. You were also treated with steroids, albuterol/ipratropium inhaler, and pulmicort. Please continue taking prednisone 40mg (2 tablets of 20mg), albuterol/ipratropium inhaler every 4 hr and Pulmicort 2 times a day and follow up with Jarrett Larsen [phone] at [address] [place] [address] for medication adjustment and full pulmonary function test  Secondary Diagnosis:	Hyperthyroidism  Instructions for follow-up, activity and diet:	You have hyperthyroidism. The ultrasound of your thyroid shows multiple nodules that will need further workup including Radioactive iodine uptake test. Please continue taking methimazole 10mg by mouth 2 times a day. While you are taking methimazole, you will need to check your blood test every week including CBC for evidence of agranulocytosis and CMP to check for LFTs to monitor for liver toxicity. You will need to follow up with [facility] within 1 week by calling .  Secondary Diagnosis:	Anxiety  Instructions for follow-up, activity and diet:	You were evaluated by the Psychiatry service for anxiety. 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 No medications are recommended at this point. Please seek further evaluation by a psychiatrist if you continue to experience anxiety. Principal Discharge DX:	Asthma exacerbation  Goal:	acute exacerbation resolved  Instructions for follow-up, activity and diet:	You were admitted for asthma exacerbation. You were admitted to the ICU and put on non-invasive pressure support mask, then High flow nasal cannula. You were also treated with steroids, albuterol/ipratropium inhaler, and pulmicort. Please continue taking prednisone 40mg (2 tablets of 20mg), albuterol/ipratropium inhaler every 4 hr and Pulmicort 2 times a day and follow up with Jarrett Larsen [phone] at [address] [place] [address] for medication adjustment and full pulmonary function test  Secondary Diagnosis:	Hyperthyroidism  Instructions for follow-up, activity and diet:	You have hyperthyroidism. The ultrasound of your thyroid shows multiple nodules that will need further workup including Radioactive iodine uptake test. Please continue taking methimazole 10mg by mouth 2 times a day. However, you will need to stop taking Methimazole 4 days prior to the Radioactive Iodine uptake test. While you are taking methimazole, you will need to check your blood test every week including CBC for evidence of agranulocytosis and CMP to check for LFTs to monitor for liver toxicity. You will need to follow up with [facility] within 1 week by calling .  Secondary Diagnosis:	Anxiety  Instructions for follow-up, activity and diet:	You were evaluated by the Psychiatry service for anxiety. 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 No medications are recommended at this point. Please seek further evaluation by a psychiatrist if you continue to experience anxiety. Principal Discharge DX:	Asthma exacerbation  Goal:	acute exacerbation resolved  Instructions for follow-up, activity and diet:	You were admitted for asthma exacerbation. You were admitted to the ICU and put on non-invasive pressure support mask, then High flow nasal cannula. You were also treated with steroids, albuterol/ipratropium inhaler, and pulmicort. Please continue taking prednisone 40mg (2 tablets of 20mg), albuterol/ipratropium inhaler every 4 hr and Pulmicort 2 times a day and follow up with Jarrett Larsen [phone] at [address] [place] [address] for medication adjustment and full pulmonary function test  Secondary Diagnosis:	Hyperthyroidism  Instructions for follow-up, activity and diet:	You have hyperthyroidism. The ultrasound of your thyroid shows multiple nodules that will need further workup including Radioactive iodine uptake test. Please continue taking methimazole 10mg by mouth 2 times a day. However, you will need to stop taking Methimazole 4 days prior to the Radioactive Iodine uptake test. While you are taking methimazole, you will need to check your blood test every week including CBC for evidence of agranulocytosis and CMP to check for LFTs to monitor for liver toxicity. Please have your CBC and CMP checked tomorrow (02/22). You will need to follow up with [facility] within 1 week by calling .  Secondary Diagnosis:	Anxiety  Instructions for follow-up, activity and diet:	You were evaluated by the Psychiatry service for anxiety. 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 No medications are recommended at this point. Please seek further evaluation by a psychiatrist if you continue to experience anxiety. Principal Discharge DX:	Asthma exacerbation  Goal:	acute exacerbation resolved  Instructions for follow-up, activity and diet:	You were admitted for asthma exacerbation. You were admitted to the ICU and put on non-invasive pressure support mask, then High flow nasal cannula. You were also treated with steroids, albuterol/ipratropium inhaler, and pulmicort. Please continue taking prednisone 40mg (2 tablets of 20mg) daily, albuterol/ipratropium inhaler every 4 hr and Pulmicort 2 times a day and follow up with Jarrett Larsen [phone] at [address] [place] [address] within 1 week for medication adjustment (including steroid taper) and full pulmonary function test  Secondary Diagnosis:	Hyperthyroidism  Instructions for follow-up, activity and diet:	You have hyperthyroidism. The ultrasound of your thyroid shows multiple nodules that will need further workup including Radioactive iodine uptake test. Please continue taking methimazole 10mg by mouth 2 times a day. However, you will need to stop taking Methimazole 4 days prior to the Radioactive Iodine uptake test. While you are taking methimazole, you will need to check your blood test every week including CBC for evidence of agranulocytosis and CMP to check for LFTs to monitor for liver toxicity. Please have your CBC and CMP checked tomorrow (02/22). You will need to follow up with [facility] within 1 week by calling .  Secondary Diagnosis:	Anxiety  Instructions for follow-up, activity and diet:	You were evaluated by the Psychiatry service for anxiety. 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 No medications are recommended at this point. Please seek further evaluation by a psychiatrist if you continue to experience anxiety. Principal Discharge DX:	Asthma exacerbation  Goal:	acute exacerbation resolved  Instructions for follow-up, activity and diet:	You were admitted for asthma exacerbation. You were admitted to the ICU and put on non-invasive pressure support mask, then High flow nasal cannula. You were also treated with steroids, albuterol/ipratropium inhaler, and pulmicort. Please continue taking prednisone 40mg (2 tablets of 20mg) daily, albuterol/ipratropium inhaler every 4 hr and Pulmicort 2 times a day and follow up with Jarrett Larsen [phone] at [address] [place] [address] within 1 week for medication adjustment (including steroid taper) and full pulmonary function test  Secondary Diagnosis:	Hyperthyroidism  Instructions for follow-up, activity and diet:	You have hyperthyroidism. The ultrasound of your thyroid shows multiple nodules that will need further workup including Radioactive iodine uptake test. Please continue taking methimazole 10mg by mouth 2 times a day. However, you will need to stop taking Methimazole 4 days prior to the Radioactive Iodine uptake test. While you are taking methimazole, you will need to check your blood test every week including CBC for evidence of agranulocytosis and CMP to check for LFTs to monitor for liver toxicity. Please have your CBC and CMP checked tomorrow (02/22). You will need to follow up with [facility] within 1 week by calling .  Secondary Diagnosis:	Anxiety  Instructions for follow-up, activity and diet:	You were evaluated by the Psychiatry service for anxiety. 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 No medications are recommended at this point. Please seek further evaluation by a psychiatrist if you continue to experience anxiety. Principal Discharge DX:	Asthma exacerbation  Goal:	acute exacerbation resolved  Instructions for follow-up, activity and diet:	You were admitted for asthma exacerbation. You were admitted to the ICU and put on non-invasive pressure support mask, then High flow nasal cannula. You were also treated with steroids, albuterol/ipratropium inhaler, and pulmicort. Please continue taking prednisone 40mg (2 tablets of 20mg) daily, albuterol/ipratropium inhaler every 4 hr and Pulmicort 2 times a day and follow up with Jarrett Larsen [phone] at [address] [place] [address] within 1 week for medication adjustment (including steroid taper) and full pulmonary function test  Secondary Diagnosis:	Hyperthyroidism  Instructions for follow-up, activity and diet:	You have hyperthyroidism. The ultrasound of your thyroid shows multiple nodules that will need further workup including Radioactive iodine uptake test. Please continue taking methimazole 10mg by mouth 2 times a day. However, you will need to stop taking Methimazole 4 days prior to the Radioactive Iodine uptake test. While you are taking methimazole, you will need to check your blood test every week including CBC for evidence of agranulocytosis and CMP to check for LFTs to monitor for liver toxicity. Please have your CBC and CMP checked tomorrow (02/22). You will need to follow up with [facility] within 1 week by calling .  Secondary Diagnosis:	Anxiety  Instructions for follow-up, activity and diet:	You were evaluated by the Psychiatry service for anxiety. 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 No medications are recommended at this point. Please seek further evaluation by a psychiatrist if you continue to experience anxiety. Principal Discharge DX:	Asthma exacerbation  Goal:	acute exacerbation resolved  Instructions for follow-up, activity and diet:	You were admitted for asthma exacerbation. You were admitted to the ICU and put on non-invasive pressure support mask, then High flow nasal cannula. You were also treated with steroids, albuterol/ipratropium inhaler, and pulmicort. Please continue taking prednisone 40mg (2 tablets of 20mg) daily, albuterol/ipratropium inhaler every 4 hr and Pulmicort 2 times a day and follow up with Jarrett Larsen [phone] at [address] [place] [address] within 1 week for medication adjustment (including steroid taper) and full pulmonary function test  Secondary Diagnosis:	Hyperthyroidism  Instructions for follow-up, activity and diet:	You have hyperthyroidism. The ultrasound of your thyroid shows multiple nodules that will need further workup including Radioactive iodine uptake test. Please continue taking methimazole 10mg by mouth 2 times a day. However, you will need to stop taking Methimazole 4 days prior to the Radioactive Iodine uptake test. While you are taking methimazole, you will need to check your blood test every week including CBC for evidence of agranulocytosis and CMP to check for LFTs to monitor for liver toxicity. Please have your CBC and CMP checked tomorrow (02/22). You will need to follow up with [facility] within 1 week by calling .  Secondary Diagnosis:	Anxiety  Instructions for follow-up, activity and diet:	You were evaluated by the Psychiatry service for anxiety. 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 No medications are recommended at this point. Please seek further evaluation by a psychiatrist if you continue to experience anxiety. Principal Discharge DX:	Asthma exacerbation  Goal:	acute exacerbation resolved  Instructions for follow-up, activity and diet:	You were admitted for asthma exacerbation. You were admitted to the ICU and put on non-invasive pressure support mask, then High flow nasal cannula. You were also treated with steroids, albuterol/ipratropium inhaler, and pulmicort. Please continue taking prednisone 40mg (2 tablets of 20mg) daily, albuterol/ipratropium inhaler every 4 hr and Pulmicort 2 times a day and follow up with Jarrett Larsen [phone] at [address] [place] [address] within 1 week for medication adjustment (including steroid taper) and full pulmonary function test  Secondary Diagnosis:	Hyperthyroidism  Instructions for follow-up, activity and diet:	You have hyperthyroidism. The ultrasound of your thyroid shows multiple nodules that will need further workup including Radioactive iodine uptake test. Please continue taking methimazole 10mg by mouth 2 times a day. However, you will need to stop taking Methimazole 4 days prior to the Radioactive Iodine uptake test. While you are taking methimazole, you will need to check your blood test every week including CBC for evidence of agranulocytosis and CMP to check for LFTs to monitor for liver toxicity. Please have your CBC and CMP checked tomorrow (02/22). You will need to follow up with [facility] within 1 week by calling .  Secondary Diagnosis:	Anxiety  Instructions for follow-up, activity and diet:	You were evaluated by the Psychiatry service for anxiety. 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 No medications are recommended at this point. Please seek further evaluation by a psychiatrist if you continue to experience anxiety. Principal Discharge DX:	Asthma exacerbation  Goal:	acute exacerbation resolved  Instructions for follow-up, activity and diet:	You were admitted for asthma exacerbation. You were admitted to the ICU and put on non-invasive pressure support mask, then High flow nasal cannula. You were also treated with steroids, albuterol/ipratropium inhaler, and pulmicort. Please continue taking prednisone 40mg (2 tablets of 20mg) daily, albuterol/ipratropium inhaler every 4 hr and Pulmicort 2 times a day and follow up with Jarrett Larsen [phone] at [address] [place] [address] within 1 week for medication adjustment (including steroid taper) and full pulmonary function test  Secondary Diagnosis:	Hyperthyroidism  Instructions for follow-up, activity and diet:	You have hyperthyroidism. The ultrasound of your thyroid shows multiple nodules that will need further workup including Radioactive iodine uptake test. Please continue taking methimazole 10mg by mouth 2 times a day. However, you will need to stop taking Methimazole 4 days prior to the Radioactive Iodine uptake test. While you are taking methimazole, you will need to check your blood test every week including CBC for evidence of agranulocytosis and CMP to check for LFTs to monitor for liver toxicity. Please have your CBC and CMP checked tomorrow (02/22). You will need to follow up with [facility] Group within 1 week by calling .  Secondary Diagnosis:	Anxiety  Instructions for follow-up, activity and diet:	You were evaluated by the Psychiatry service for anxiety. 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 No medications are recommended at this point. Please seek further evaluation by a psychiatrist if you continue to experience anxiety.

## 2017-02-16 NOTE — PROGRESS NOTE ADULT - PROBLEM SELECTOR PLAN 5
HSQ, SCDs; mISS while on corticosteroids While patient is sleeping, she does not have tachypnea, unclear if there is an element of anxiety involved. When patient is questioned about anxiety she denies it, but as per  on phone she has fast hurried speech pattern. Psych consulted  - f/u Psych recs

## 2017-02-16 NOTE — PROGRESS NOTE ADULT - PROBLEM SELECTOR PLAN 3
most likely 2/2 corticosteroids, no signs of infection, pt afebrile since admission  - monitor for infectious signs/sx  - full fever work up if febrile per MICU team/hx, pt may not be able to afford medication as outpt  - Endo consulted, Recs appreciated  - C/w methimazole 10mg q12h  - Check CBC every week for evidence of agranulocytosis 2/2 to methimazole and LFTs once weekly.   - f/u thyroid US

## 2017-02-16 NOTE — DISCHARGE NOTE ADULT - CARE PROVIDERS DIRECT ADDRESSES
,DirectAddress_Unknown,DirectAddress_Unknown ,DirectAddress_Unknown,[email or web address],DirectAddress_Unknown

## 2017-02-16 NOTE — PROGRESS NOTE ADULT - PROBLEM SELECTOR PLAN 4
While patient is sleeping, she does not have tachypnea, unclear if there is an element of anxiety involved. When patient is questioned about anxiety she denies it, but as per  on phone she has fast hurried speech pattern. Psych consulted  - f/u Psych recs most likely 2/2 corticosteroids, no signs of infection, pt afebrile since admission  - monitor for infectious signs/sx  - full fever work up if febrile

## 2017-02-16 NOTE — PROGRESS NOTE ADULT - PROBLEM SELECTOR PLAN 1
likely exacerbation in setting of influenza in a poorly controlled asthmatic.  Since admission much improved with bronchodilator therapy and high dose steroids. Started on Tamiflu as it appears she did not complete the course at [facility] 2 weeks ago.   CT of the chest with significant tree-in-bud pattern, suggestive of small AW disease - might all be explained by recent Influenza infection, however could also be ABPA in setting of elevated IgE. AFB negative.   - pending  Aspergillous percipitant   - C/W  Prednisone 40mg daily - will need a long course.  - d/c Advair and start Pulmicort nebs in addition to Duonebs - she should remain on this regimen as out pt as well as it will reach the small AW much better that rehular inhalers.

## 2017-02-16 NOTE — DISCHARGE NOTE ADULT - MEDICATION SUMMARY - MEDICATIONS TO TAKE
I will START or STAY ON the medications listed below when I get home from the hospital:    predniSONE 20 mg oral tablet  -- 2 tab(s) (total dose of prednisone 40mg) by mouth once a day  -- It is very important that you take or use this exactly as directed.  Do not skip doses or discontinue unless directed by your doctor.  Obtain medical advice before taking any non-prescription drugs as some may affect the action of this medication.  Take with food or milk.    -- Indication: For Asthma/COPD    budesonide 0.25 mg/2 mL inhalation suspension  -- 0.25 milligram(s) inhaled 2 times a day  -- Indication: For Asthma/COPD    methIMAzole 10 mg oral tablet  -- 1 tab(s) by mouth 2 times a day  -- Do not take this drug if you are pregnant.  It is very important that you take or use this exactly as directed.  Do not skip doses or discontinue unless directed by your doctor.    -- Indication: For Hyperthyroidism    albuterol-ipratropium 2.5 mg-0.5 mg/3 mL inhalation solution  -- 3 milliliter(s) inhaled every 4 hours, As Needed  -- Indication: For Asthma/COPD I will START or STAY ON the medications listed below when I get home from the hospital:    predniSONE 20 mg oral tablet  -- 2 tab(s) (total dose of prednisone 40mg) by mouth once a day  -- It is very important that you take or use this exactly as directed.  Do not skip doses or discontinue unless directed by your doctor.  Obtain medical advice before taking any non-prescription drugs as some may affect the action of this medication.  Take with food or milk.    -- Indication: For Asthma/COPD    budesonide 0.25 mg/2 mL inhalation suspension  -- 0.25 milligram(s) inhaled 2 times a day  -- Indication: For Asthma/COPD    methIMAzole 10 mg oral tablet  -- 1 tab(s) by mouth 2 times a day  -- Do not take this drug if you are pregnant.  It is very important that you take or use this exactly as directed.  Do not skip doses or discontinue unless directed by your doctor.    -- Indication: For Hyperthyroidism    albuterol-ipratropium 2.5 mg-0.5 mg/3 mL inhalation solution  -- 3 milliliter(s) inhaled every 4 hours  -- Indication: For Asthma/COPD I will START or STAY ON the medications listed below when I get home from the hospital:    predniSONE 20 mg oral tablet  -- 2 tab(s) (total dose of prednisone 40mg) by mouth once a day  -- It is very important that you take or use this exactly as directed.  Do not skip doses or discontinue unless directed by your doctor.  Obtain medical advice before taking any non-prescription drugs as some may affect the action of this medication.  Take with food or milk.    -- Indication: For Asthma/COPD

## 2017-02-16 NOTE — DISCHARGE NOTE ADULT - MEDICATION SUMMARY - MEDICATIONS TO CHANGE
I will SWITCH the dose or number of times a day I take the medications listed below when I get home from the hospital:    predniSONE 10 mg oral tablet  -- 1 tab(s) by mouth once a day    methIMAzole 10 mg oral tablet  -- 1 tab(s) by mouth every 8 hours

## 2017-02-16 NOTE — DISCHARGE NOTE ADULT - NS AS ACTIVITY OBS
Stairs allowed/Walking-Indoors allowed/Bathing allowed/Walking-Outdoors allowed/Return to Work/School allowed/Showering allowed

## 2017-02-16 NOTE — DISCHARGE NOTE ADULT - PATIENT PORTAL LINK FT
“You can access the Follow[facility] Patient Portal, offered by [facility], by registering with the following website: [email or web address]”

## 2017-02-16 NOTE — DISCHARGE NOTE ADULT - PLAN OF CARE
You were admitted for asthma exacerbation. You were You have hyperthyroidism. The ultrasound of your thyroid shows . Please continue taking methimazole 10mg by mouth 2 times a day and follow up with You were admitted for asthma exacerbation. You were admitted to the ICU and put on non-invasive pressure support mask, then acute exacerbation resolved You have hyperthyroidism. The ultrasound of your thyroid shows multiple nodules that will need further workup including Radioactive iodine uptake test. Please continue taking methimazole 10mg by mouth 2 times a day. While you are taking methimazole, you will need to check your blood test every week including CBC for evidence of agranulocytosis and CMP to check for LFTs. You will need to follow up with [facility] within 1 week by calling . You were evaluated by the Psychiatry service for anxiety. 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 No medications are recommended at this point. Please seek further evaluation by a psychiatrist if you continue to experience anxiety. You were admitted for asthma exacerbation. You were admitted to the ICU and put on non-invasive pressure support mask, then Hifh flow nasal cannula. You were also treated with steroids, albuterol/ipratropium inhaler, and pulmicort. Please continue taking prednisone 40mg (2 tablets of 20mg), albuterol/ipratropium inhaler every 4 hr and Pulmicort 2 times a day and follow up with Jarrett Larsen [phone] at [address] [place] [address] for medication adjustment and full pulmonary function test You have hyperthyroidism. The ultrasound of your thyroid shows multiple nodules that will need further workup including Radioactive iodine uptake test. Please continue taking methimazole 10mg by mouth 2 times a day. While you are taking methimazole, you will need to check your blood test every week including CBC for evidence of agranulocytosis and CMP to check for LFTs to monitor for liver toxicity. You will need to follow up with [facility] within 1 week by calling . You were admitted for asthma exacerbation. You were admitted to the ICU and put on non-invasive pressure support mask, then High flow nasal cannula. You were also treated with steroids, albuterol/ipratropium inhaler, and pulmicort. Please continue taking prednisone 40mg (2 tablets of 20mg), albuterol/ipratropium inhaler every 4 hr and Pulmicort 2 times a day and follow up with Jarrett Larsen [phone] at [address] [place] [address] for medication adjustment and full pulmonary function test You have hyperthyroidism. The ultrasound of your thyroid shows multiple nodules that will need further workup including Radioactive iodine uptake test. Please continue taking methimazole 10mg by mouth 2 times a day. However, you will need to stop taking Methimazole 4 days prior to the Radioactive Iodine uptake test. While you are taking methimazole, you will need to check your blood test every week including CBC for evidence of agranulocytosis and CMP to check for LFTs to monitor for liver toxicity. You will need to follow up with [facility] within 1 week by calling . You have hyperthyroidism. The ultrasound of your thyroid shows multiple nodules that will need further workup including Radioactive iodine uptake test. Please continue taking methimazole 10mg by mouth 2 times a day. However, you will need to stop taking Methimazole 4 days prior to the Radioactive Iodine uptake test. While you are taking methimazole, you will need to check your blood test every week including CBC for evidence of agranulocytosis and CMP to check for LFTs to monitor for liver toxicity. Please have your CBC and CMP checked tomorrow (02/22). You will need to follow up with [facility] within 1 week by calling . You were admitted for asthma exacerbation. You were admitted to the ICU and put on non-invasive pressure support mask, then High flow nasal cannula. You were also treated with steroids, albuterol/ipratropium inhaler, and pulmicort. Please continue taking prednisone 40mg (2 tablets of 20mg) daily, albuterol/ipratropium inhaler every 4 hr and Pulmicort 2 times a day and follow up with Jarrett Larsen [phone] at [address] [place] [address] within 1 week for medication adjustment (including steroid taper) and full pulmonary function test

## 2017-02-16 NOTE — DISCHARGE NOTE ADULT - HOSPITAL COURSE
Patient is an 83yo F recently emigrated from [facility] (11[place]) w/ PMH hyperthyroidism, 25 year hx of asthma (w/ recent ER visit at [facility] for exacerbation w/ +flu, received albuterol/prednisone/tamiflu, d/c-ed on theophylline; no hx of intubations). Pt was unable to fill her meds 2/2 insurance issues and then presented to [facility] 2/9 w/ worsening dyspnea and cough. In the ED patient had respiratory distress and was placed on BiPAP. Pt continued to be tachypneic up to 40, and was admitted to the ICU. While in the ICU pt was treated for an asthma exacerbation with Prednisone, Advair, Duonebs. Ativan 0.5 BID was added on with suspicion that there was a component of anxiety triggering asthma. However pt continued to be intermittently tachypneic, requiring BiPaP. CT chest demonstrated findings consistent with multifocal moderate infectious/inflammatory bronchiolitis. Pulm consulted given appropriate treatment and lack of clinical improvement as pt still tachypneic. ECHO shows EF60% w/ mild impaired LV relaxation. IgE was found to be elevated at 488. Patient was placed on HFNC intermittently for increased work of breathing, however patient never had desaturation. Endocrine consulted for history of hyperthyroid, and ordered for ultrasound of thyroid. Otherwise, VSS and patient was hemodynamically stable for step down to regional medical floor. On RMF, pt was weaned off of HFNC, continued on prednisone, duonebs, and pulmicort for asthma exacerbation and methimazole for hyperthyroid. Patient is an 81yo F recently emigrated from [facility] (11/2016) w/ PMH hyperthyroidism, 25 year hx of asthma (w/ recent ER visit at [facility] for exacerbation w/ +flu, received albuterol/prednisone/tamiflu, d/c-ed on theophylline; no hx of intubations). Pt was unable to fill her meds 2/2 insurance issues and then presented to [facility] 2/9 w/ worsening dyspnea and cough. In the ED patient had respiratory distress and was placed on BiPAP. Pt continued to be tachypneic up to 40, and was admitted to the ICU. While in the ICU pt was treated for an asthma exacerbation with Prednisone, Advair, Duonebs. Ativan 0.5 BID was added on with suspicion that there was a component of anxiety triggering asthma. However pt continued to be intermittently tachypneic, requiring BiPaP. CT chest demonstrated findings consistent with multifocal moderate infectious/inflammatory bronchiolitis. Pulm consulted given appropriate treatment and lack of clinical improvement as pt still tachypneic. ECHO shows EF60% w/ mild impaired LV relaxation. IgE was found to be elevated at 488. Patient was placed on HFNC intermittently for increased work of breathing, however patient never had desaturation. Endocrine consulted for history of hyperthyroid, and ordered for ultrasound of thyroid. Otherwise, VSS and patient was hemodynamically stable for step down to regional medical floor. On RMF, pt was weaned off of HFNC, continued on prednisone, duonebs, and pulmicort for asthma exacerbation and methimazole for hyperthyroid. US thyroid shows Patient is an 83yo F recently emigrated from [facility] (11/2016) w/ PMH hyperthyroidism, 25 year hx of asthma (w/ recent ER visit at [facility] for exacerbation w/ +flu, received albuterol/prednisone/tamiflu, d/c-ed on theophylline; no hx of intubations). Pt was unable to fill her meds 2/2 insurance issues and then presented to [facility] 2/9 w/ worsening dyspnea and cough. In the ED patient had respiratory distress and was placed on BiPAP. Pt continued to be tachypneic up to 40, and was admitted to the ICU. While in the ICU pt was treated for an asthma exacerbation with Prednisone, Advair, Duonebs. Ativan 0.5 BID was added on with suspicion that there was a component of anxiety triggering asthma. However pt continued to be intermittently tachypneic, requiring BiPaP. CT chest demonstrated findings consistent with multifocal moderate infectious/inflammatory bronchiolitis. Pulm consulted given appropriate treatment and lack of clinical improvement as pt still tachypneic. ECHO shows EF60% w/ mild impaired LV relaxation. IgE was found to be elevated at 488. Patient was placed on HFNC intermittently for increased work of breathing, likely with component of anxiety. Endocrine consulted for history of hyperthyroid, and ordered for ultrasound of thyroid. Pt was weaned off of HFNC, continued on prednisone, duonebs, and pulmicort for asthma exacerbation and methimazole for hyperthyroid. US thyroid shows multiple nodules that will need further workup outpt. Psych consulted for anxiety and believed that pt likely has Adjustment Disorder with Anxious Mood, and recommends to defer psychotropic meds. Patient is HD stable for discharge to [facility]. She will continue Prednisone 40mg daily, Duonebs, and Pulmicort and follow up with Dr. Moore for steroid taper and full PFTs. She will also continue methimazole 10mg twice daily and follow up with Endocrine for further workup of thyroid nodules with radioactive iodine uptake test.

## 2017-02-16 NOTE — DISCHARGE NOTE ADULT - CARE PROVIDER_API CALL
[facility],   Phone: [phone]  Fax: (       - [facility],   Phone: [phone]  Fax: (   )    -    Jarrett Moore), Critical Care Medicine; Internal Medicine; Pulmonary Disease  [address] [place] [address]  Phone: [phone]  Fax: [phone]

## 2017-02-16 NOTE — PROGRESS NOTE ADULT - PROBLEM SELECTOR PLAN 1
Patient has had recurrent intermittent episodes with tachypnea, increased work of breathing, and distress. Patient has not had desaturation during these events. CT of the chest shows consistent with multifocal moderate infectious/inflammatory. IgE elevated to 488.  bronchiolitis. 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

## 2017-02-16 NOTE — PROGRESS NOTE ADULT - SUBJECTIVE AND OBJECTIVE BOX
INTERVAL HPI/OVERNIGHT EVENTS: no events overnight    VITAL SIGNS:  T(F): 98.5  HR: 85  BP: 106/64  RR: 18  SpO2: 98%  Wt(kg): --    PHYSICAL EXAM:    General: Pt looks younger than stated age, NAD  HEENT: PERRL/ EOMI, no ptosis, MMM, no thyromegaly or nodules palpated  Respiratory: Diffuse rhonchi and expiratory wheezing b/l, RR 24. Patient is able to speak in full sentences.  Cardiovascular: RRR, +S1 + S2, no m/r/g  Abdomen: Soft, NTND, normoactive bowel sounds, no rebound, no guarding, no suprapubic tenderness  Extremities: No cyanosis, no clubbing, no edema, pulses equal, no calf tenderness      MEDICATIONS  (STANDING):  ALBUTerol/ipratropium for Nebulization 3milliLiter(s) Nebulizer every 4 hours  insulin lispro (HumaLOG) corrective regimen sliding scale  SubCutaneous Before meals and at bedtime  heparin  Injectable 5000Unit(s) SubCutaneous every 8 hours  sodium chloride 3%  Inhalation 5milliLiter(s) Inhalation once  methimazole 10milliGRAM(s) Oral every 12 hours  predniSONE   Tablet 60milliGRAM(s) Oral daily  buDESOnide   0.25 milliGRAM(s) Respule 0.25milliGRAM(s) Inhalation two times a day    MEDICATIONS  (PRN):  acetaminophen   Tablet. 650milliGRAM(s) Oral every 6 hours PRN Moderate Pain (4 - 6)      Allergies    No Known Allergies    Intolerances        LABS:                        12.7   18.8  )-----------( 284      ( 15 Feb 2017 06:30 )             38.9     16 Feb 2017 06:18    144    |  112    |  21     ----------------------------<  101    3.9     |  27     |  0.72     Ca    8.5        16 Feb 2017 06:18  Phos  4.5       15 Feb 2017 06:30  Mg     2.2       16 Feb 2017 06:18            RADIOLOGY & ADDITIONAL TESTS:

## 2017-02-16 NOTE — PROGRESS NOTE ADULT - PROBLEM SELECTOR PLAN 2
per MICU team/hx, pt may not be able to afford medication as outpt  - Endo consulted, Recs appreciated  - C/w methimazole 10mg q12h  - Check CBC every week for evidence of agranulocytosis 2/2 to methimazole and LFTs once weekly.   - f/u thyroid US Wean off HFNC today

## 2017-02-16 NOTE — PROGRESS NOTE ADULT - PROBLEM SELECTOR PLAN 6
Replete lytes to keep K>4 and Mg>2  Regular diet    Dispo: Case mgmt/SW for medication affordability    FULL CODE HSQ, SCDs; mISS while on corticosteroids

## 2017-02-16 NOTE — PROGRESS NOTE ADULT - SUBJECTIVE AND OBJECTIVE BOX
Interval Events:  Patient seen and examined at bedside.    MEDICATIONS:  Pulmonary:  ALBUTerol/ipratropium for Nebulization 3milliLiter(s) Nebulizer every 4 hours  fluticasone / salmeterol 250-50 MICROgram(s) Diskus 1Dose(s) Inhalation two times a day  sodium chloride 3%  Inhalation 5milliLiter(s) Inhalation once    Antimicrobials:    Anticoagulants:  heparin  Injectable 5000Unit(s) SubCutaneous every 8 hours    Cardiac:      Allergies    No Known Allergies    Intolerances        Vital Signs Last 24 Hrs  T(C): 36.9, Max: 36.9 (02-16 @ 09:46)  T(F): 98.5, Max: 98.5 (02-16 @ 09:46)  HR: 88 (78 - 98)  BP: 106/64 (92/54 - 127/59)  BP(mean): 79 (79 - 85)  RR: 18 (18 - 20)  SpO2: 98% (98% - 100%)  I & Os for 24h ending 02-16 @ 07:00  =============================================  IN: 0 ml / OUT: 700 ml / NET: -700 ml    I & Os for current day (as of 02-16 @ 11:03)  =============================================  IN: 0 ml / OUT: 200 ml / NET: -200 ml        LABS:      CBC Full  -  ( 15 Feb 2017 06:30 )  WBC Count : 18.8 K/uL  Hemoglobin : 12.7 g/dL  Hematocrit : 38.9 %  Platelet Count - Automated : 284 K/uL  Mean Cell Volume : 88.4 fL  Mean Cell Hemoglobin : 28.9 pg  Mean Cell Hemoglobin Concentration : 32.6 g/dL  Auto Neutrophil # : x  Auto Lymphocyte # : x  Auto Monocyte # : x  Auto Eosinophil # : x  Auto Basophil # : x  Auto Neutrophil % : 69.0 %  Auto Lymphocyte % : 24.0 %  Auto Monocyte % : 3.0 %  Auto Eosinophil % : x  Auto Basophil % : x    16 Feb 2017 06:18    144    |  112    |  21     ----------------------------<  101    3.9     |  27     |  0.72     Ca    8.5        16 Feb 2017 06:18  Phos  4.5       15 Feb 2017 06:30  Mg     2.2       16 Feb 2017 06:18    TPro  7.1    /  Alb  2.8    /  TBili  0.6    /  DBili  x      /  AST  19     /  ALT  39     /  AlkPhos  69     14 Feb 2017 11:45                      RADIOLOGY & ADDITIONAL STUDIES (The following images were personally reviewed): Interval Events:  Patient seen and examined at bedside. No significant change since yesterday    MEDICATIONS:  Pulmonary:  ALBUTerol/ipratropium for Nebulization 3milliLiter(s) Nebulizer every 4 hours  fluticasone / salmeterol 250-50 MICROgram(s) Diskus 1Dose(s) Inhalation two times a day  sodium chloride 3%  Inhalation 5milliLiter(s) Inhalation once    Antimicrobials:    Anticoagulants:  heparin  Injectable 5000Unit(s) SubCutaneous every 8 hours    Cardiac:      Allergies    No Known Allergies    Intolerances        Vital Signs Last 24 Hrs  T(C): 36.9, Max: 36.9 (02-16 @ 09:46)  T(F): 98.5, Max: 98.5 (02-16 @ 09:46)  HR: 88 (78 - 98)  BP: 106/64 (92/54 - 127/59)  BP(mean): 79 (79 - 85)  RR: 18 (18 - 20)  SpO2: 98% (98% - 100%)  I & Os for 24h ending 02-16 @ 07:00  =============================================  IN: 0 ml / OUT: 700 ml / NET: -700 ml    I & Os for current day (as of 02-16 @ 11:03)  =============================================  IN: 0 ml / OUT: 200 ml / NET: -200 ml    PHYSICAL EXAM:  General: Well developed; well nourished; in no acute distress  HEENT: PERRL, EOMI, non icteric  Neck: Supple;   Respiratory: good BS b/l, mild scattered rhonchi (improved) and mild diffused exp wheezing  Cardiovascular: Regular rate and rhythm. S1 and S2 Normal; No murmurs  Gastrointestinal: Soft non-tender non-distended;  Extremities: WWP, no edema, no cyanosis  Neurological: Alert and oriented  Skin: Warm and dry. No obvious rash      LABS:      CBC Full  -  ( 15 Feb 2017 06:30 )  WBC Count : 18.8 K/uL  Hemoglobin : 12.7 g/dL  Hematocrit : 38.9 %  Platelet Count - Automated : 284 K/uL  Mean Cell Volume : 88.4 fL  Mean Cell Hemoglobin : 28.9 pg  Mean Cell Hemoglobin Concentration : 32.6 g/dL  Auto Neutrophil # : x  Auto Lymphocyte # : x  Auto Monocyte # : x  Auto Eosinophil # : x  Auto Basophil # : x  Auto Neutrophil % : 69.0 %  Auto Lymphocyte % : 24.0 %  Auto Monocyte % : 3.0 %  Auto Eosinophil % : x  Auto Basophil % : x    16 Feb 2017 06:18    144    |  112    |  21     ----------------------------<  101    3.9     |  27     |  0.72     Ca    8.5        16 Feb 2017 06:18  Phos  4.5       15 Feb 2017 06:30  Mg     2.2       16 Feb 2017 06:18    TPro  7.1    /  Alb  2.8    /  TBili  0.6    /  DBili  x      /  AST  19     /  ALT  39     /  AlkPhos  69     14 Feb 2017 11:45                      RADIOLOGY & ADDITIONAL STUDIES (The following images were personally reviewed):

## 2017-02-16 NOTE — DISCHARGE NOTE ADULT - MEDICATION SUMMARY - MEDICATIONS TO STOP TAKING
I will STOP taking the medications listed below when I get home from the hospital:    mometasone 220 mcg/inh inhalation aerosol powder  -- 1 puff(s) inhaled 2 times a day    ipratropium  --  inhaled    oseltamivir 75 mg oral capsule  -- 1 cap(s) by mouth 2 times a day    albuterol  --  inhaled    amitriptyline 10 mg oral tablet  -- 1 tab(s) by mouth once a day (at bedtime)    venlafaxine 37.5 mg oral capsule, extended release  -- 1 cap(s) by mouth once a day    clonazePAM 0.5 mg oral tablet  -- 1 tab(s) by mouth 3 times a day, As Needed    griseofulvin microcrystalline 500 mg oral tablet  -- 1 tab(s) by mouth once a day    sertraline 25 mg oral tablet  -- 1 tab(s) by mouth once a day    theophylline 400 mg/24 hours oral capsule, extended release  -- 1 cap(s) by mouth once a day

## 2017-02-17 LAB
CULTURE RESULTS: SIGNIFICANT CHANGE UP
GRAM STN FLD: SIGNIFICANT CHANGE UP
NIGHT BLUE STAIN TISS: SIGNIFICANT CHANGE UP
SPECIMEN SOURCE: SIGNIFICANT CHANGE UP
SPECIMEN SOURCE: SIGNIFICANT CHANGE UP

## 2017-02-17 PROCEDURE — 99223 1ST HOSP IP/OBS HIGH 75: CPT

## 2017-02-17 PROCEDURE — 99232 SBSQ HOSP IP/OBS MODERATE 35: CPT | Mod: GC

## 2017-02-17 PROCEDURE — 99232 SBSQ HOSP IP/OBS MODERATE 35: CPT

## 2017-02-17 RX ORDER — MOMETASONE FUROATE 220 UG/1
1 INHALANT RESPIRATORY (INHALATION)
Qty: 0 | Refills: 0 | COMMUNITY

## 2017-02-17 RX ORDER — METHIMAZOLE 10 MG/1
1 TABLET ORAL
Qty: 0 | Refills: 0 | COMMUNITY

## 2017-02-17 RX ORDER — SERTRALINE 25 MG/1
1 TABLET, FILM COATED ORAL
Qty: 0 | Refills: 0 | COMMUNITY

## 2017-02-17 RX ORDER — METHIMAZOLE 10 MG/1
1 TABLET ORAL
Qty: 60 | Refills: 0 | OUTPATIENT
Start: 2017-02-17 | End: 2017-03-19

## 2017-02-17 RX ORDER — IPRATROPIUM BROMIDE 0.2 MG/ML
0 SOLUTION, NON-ORAL INHALATION
Qty: 0 | Refills: 0 | COMMUNITY

## 2017-02-17 RX ORDER — IPRATROPIUM/ALBUTEROL SULFATE 18-103MCG
3 AEROSOL WITH ADAPTER (GRAM) INHALATION
Qty: 2 | Refills: 0 | OUTPATIENT
Start: 2017-02-17 | End: 2017-03-19

## 2017-02-17 RX ORDER — BUDESONIDE, MICRONIZED 100 %
0.25 POWDER (GRAM) MISCELLANEOUS
Qty: 20 | Refills: 0 | OUTPATIENT
Start: 2017-02-17 | End: 2017-02-27

## 2017-02-17 RX ORDER — VENLAFAXINE HCL 75 MG
1 CAPSULE, EXT RELEASE 24 HR ORAL
Qty: 0 | Refills: 0 | COMMUNITY

## 2017-02-17 RX ORDER — CLONAZEPAM 1 MG
1 TABLET ORAL
Qty: 0 | Refills: 0 | COMMUNITY

## 2017-02-17 RX ORDER — BUDESONIDE, MICRONIZED 100 %
0.25 POWDER (GRAM) MISCELLANEOUS
Qty: 10 | Refills: 0 | OUTPATIENT
Start: 2017-02-17 | End: 2017-02-27

## 2017-02-17 RX ORDER — AMITRIPTYLINE HCL 25 MG
1 TABLET ORAL
Qty: 0 | Refills: 0 | COMMUNITY

## 2017-02-17 RX ORDER — ALBUTEROL 90 UG/1
0 AEROSOL, METERED ORAL
Qty: 0 | Refills: 0 | COMMUNITY

## 2017-02-17 RX ORDER — THEOPHYLLINE ANHYDROUS 200 MG
1 TABLET, EXTENDED RELEASE 12 HR ORAL
Qty: 0 | Refills: 0 | COMMUNITY

## 2017-02-17 RX ORDER — ULTRAMICROSIZE GRISEOFULVIN 250 MG/1
1 TABLET ORAL
Qty: 0 | Refills: 0 | COMMUNITY

## 2017-02-17 RX ADMIN — Medication 3 MILLILITER(S): at 10:43

## 2017-02-17 RX ADMIN — Medication 0.25 MILLIGRAM(S): at 17:59

## 2017-02-17 RX ADMIN — HEPARIN SODIUM 5000 UNIT(S): 5000 INJECTION INTRAVENOUS; SUBCUTANEOUS at 14:27

## 2017-02-17 RX ADMIN — Medication 3 MILLILITER(S): at 06:14

## 2017-02-17 RX ADMIN — HEPARIN SODIUM 5000 UNIT(S): 5000 INJECTION INTRAVENOUS; SUBCUTANEOUS at 06:14

## 2017-02-17 RX ADMIN — Medication 3 MILLILITER(S): at 14:27

## 2017-02-17 RX ADMIN — Medication 4: at 12:15

## 2017-02-17 RX ADMIN — Medication 60 MILLIGRAM(S): at 06:15

## 2017-02-17 RX ADMIN — Medication 3 MILLILITER(S): at 22:05

## 2017-02-17 RX ADMIN — Medication 3 MILLILITER(S): at 18:00

## 2017-02-17 RX ADMIN — Medication 6: at 18:09

## 2017-02-17 RX ADMIN — HEPARIN SODIUM 5000 UNIT(S): 5000 INJECTION INTRAVENOUS; SUBCUTANEOUS at 22:06

## 2017-02-17 RX ADMIN — Medication 0: at 22:34

## 2017-02-17 NOTE — CONSULT NOTE ADULT - SUBJECTIVE AND OBJECTIVE BOX
Pt seen; chart reviewed and discussed with team. (Of note: Consult attempted yesterday, but [place] interpretor unavailable.)    HPI: Ms. Joel is an 82 year old [place] woman who presents to [facility] complaining of shortness of breath and weakness. Per H&P, recently visited ER at [facility] for exacerbation of asthma + flu. Currently unable to fill medications 2/2 to insurance/financial issues. Presents with worsening dyspnea, cough, inability to ambulate w/o SOB. 3 hospitalizations this year for fevers. All symptoms in context of [place] in [place] to be with son and daughter-in-law and her 3 grandchildren.    Pulmonary team requested psychiatry consult to understand if difficulty breathing is secondary to anxiety.    Pt interviewed using telephonic interpreting service.      Past Psychiatric Hx: The patient does not report any psychiatric history.    Past Medical Hx: Hyperthyroidism, 24 y/o h/o asthma  (recent ER visit at [facility] for exacerbation w/ +flu received albuterol/prednisone/tamiflu, d/c-ed on theophylline; no hx of intubations).    Allergies    No Known Allergies    MEDICATIONS  (STANDING):  ALBUTerol/ipratropium for Nebulization 3milliLiter(s) Nebulizer every 4 hours  insulin lispro (HumaLOG) corrective regimen sliding scale  SubCutaneous Before meals and at bedtime  heparin  Injectable 5000Unit(s) SubCutaneous every 8 hours  sodium chloride 3%  Inhalation 5milliLiter(s) Inhalation once  methimazole 10milliGRAM(s) Oral every 12 hours  buDESOnide   0.25 milliGRAM(s) Respule 0.25milliGRAM(s) Inhalation two times a day    MEDICATIONS  (PRN):  acetaminophen   Tablet. 650milliGRAM(s) Oral every 6 hours PRN Moderate Pain (4 - 6)    Social Hx: Via  service on phone, the patient reports currently living in the [place] with her son, daughter-in-law, and three grandchildren. [place] in [place] because this son wanted her to be close. She has 5 other sons and one daughter in [facility]. [place] previously [place].  passed away four years ago. Pt unable to report what son/daughter-in-law do for work, but says they work hard to make money for kids and for her.     Substance Abuse Hx: None reported.    Family History: None reported.    ROS: Psych: See HPI.   All other systems negative.    LABS:         Vitals:    Imaging, Other Tests:     Old records reviewed:     Collateral:     EXAM:    Gen Appearance: Weak, small, difficulty moving.  Gait/Station/Muscle Tone, Abnl Movements:   Speech:  Quiet, not articulated well.  TP: Good responses to questions.  Associations:   TC: WNL.  Mood: "Very weak."  Affect: Low.  Consciousness/orientation: AOx2. Knows name, knows at hospital but not which one, does not know year.  Memory: Recent:	Remote: Not assessed.  Attention/Concentration: Good concentration in interview.  Language:   Fund of Knowledge:   Insight: Unclear. Pt denies psychiatric etiology of symptoms.  Judgment:     Suicide Risk Assessment: Does not appear at risk.      Assessment and Recommendation:   IMP/REC: Ms. Joel is an 82 year old woman suffering from pulmonary symptoms including shortness of breath, asthma, weakness. She lives with her son and his family in the [place]; she recently emigrated from [place] to the  a few months ago. Since that time the patient has experienced worsening symptoms of SOB and weakness, yielding multiple hospitalizations. She notes that her son words hard to support her and his family. The pt denies worrying, feeling sad, or any other mood symptoms. It appears plausible that she is suffering a decline in the context of her immigration to a new country and the loss of her  four years ago. The patient reports sufficient support at home, but insufficient finances to afford medication. She would like nebulizer, walked, and oxygen for home. Impression: adjustment disorder. Somatic symptoms appear to be physiological in origin and exacerbated by adjustment to new country and family situation. Presently do not recommend psychotropic medications. Recommend consultation by social work to help problem solve re medications, nebulizer, oxygen, and walker for the home. Recommend consultation with PT to ensure fitness for discharge.    Observation Status: Not required. Pt seen; chart reviewed and discussed with team. (Of note: Consult attempted yesterday, but [place] interpretor unavailable.)    HPI: Ms. Joel is an 82 year old [place] woman who presents to [facility] complaining of shortness of breath and weakness. Per H&P, recently visited ER at [facility] for exacerbation of asthma + flu. Currently unable to fill medications 2/2 to insurance/financial issues. Presents with worsening dyspnea, cough, inability to ambulate w/o SOB. 3 hospitalizations this year for fevers. All symptoms in context [place] be with son and daughter-in-law and her 3 grandchildren.    Pulmonary team requested psychiatry consult to understand if difficulty breathing is secondary to anxiety.    Pt interviewed using telephonic interpreting service.      Past Psychiatric Hx: The patient does not report any psychiatric history.    Past Medical Hx: Hyperthyroidism, 26 y/o h/o asthma  (recent ER visit at [facility] for exacerbation w/ +flu received albuterol/prednisone/tamiflu, d/c-ed on theophylline; no hx of intubations).    Allergies    No Known Allergies    MEDICATIONS  (STANDING):  ALBUTerol/ipratropium for Nebulization 3milliLiter(s) Nebulizer every 4 hours  insulin lispro (HumaLOG) corrective regimen sliding scale  SubCutaneous Before meals and at bedtime  heparin  Injectable 5000Unit(s) SubCutaneous every 8 hours  sodium chloride 3%  Inhalation 5milliLiter(s) Inhalation once  methimazole 10milliGRAM(s) Oral every 12 hours  buDESOnide   0.25 milliGRAM(s) Respule 0.25milliGRAM(s) Inhalation two times a day    MEDICATIONS  (PRN):  acetaminophen   Tablet. 650milliGRAM(s) Oral every 6 hours PRN Moderate Pain (4 - 6)    Social Hx: Via  service on phone, the patient reports currently living in the [place] with her son, daughter-in-law, and three grandchildren. [place] because this son wanted her to be close. She has 5 other sons and one daughter in [place] [facility]. [place].  passed away four years ago. Pt unable to report what son/daughter-in-law do for work, but says they work hard to make money for kids and for her.     Substance Abuse Hx: None reported.    Family History: None reported.    ROS: Psych: See HPI.   All other systems negative.    16 Feb 2017 06:18    144    |  112    |  21     ----------------------------<  101    3.9     |  27     |  0.72     Ca    8.5        16 Feb 2017 06:18  Mg     2.2       16 Feb 2017 06:18             Vitals: Vital Signs Last 24 Hrs  T(C): 36.4, Max: 36.4 (02-17 @ 05:30)  T(F): 97.6, Max: 97.6 (02-17 @ 05:30)  HR: 98 (84 - 98)  BP: 115/54 (111/53 - 115/55)  BP(mean): --  RR: 17 (16 - 20)  SpO2: 99% (96% - 99%)      MSE EXAM:    Gen Appearance: Weak, small, difficulty moving.  Gait/Station/Muscle Tone, Abnl Movements:   Speech:  Quiet, not articulated well.  TP: Good responses to questions. Logical, goal-directed  TC: No SI/HI, No AH/VH/PI  Mood: "Very weak."  Affect: Low. Mood congruent, appropriate.  Consciousness/orientation: AOx2. Knows name, knows at hospital but not which one, does not know year.  Memory: Recent:	Remote: Not assessed.  Attention/Concentration: Good concentration in interview.  Language:   Fund of Knowledge:   Insight: Fair. Pt denies psychiatric component to her anxiety.  Judgment: Fair.    Suicide Risk Assessment: Pt with no hx of/current thoughts of self-harm or suicidality. Low risk.

## 2017-02-17 NOTE — PROGRESS NOTE ADULT - SUBJECTIVE AND OBJECTIVE BOX
Interval Events:  Patient seen and examined at bedside.    MEDICATIONS:  Pulmonary:  ALBUTerol/ipratropium for Nebulization 3milliLiter(s) Nebulizer every 4 hours  sodium chloride 3%  Inhalation 5milliLiter(s) Inhalation once  buDESOnide   0.25 milliGRAM(s) Respule 0.25milliGRAM(s) Inhalation two times a day    Antimicrobials:    Anticoagulants:  heparin  Injectable 5000Unit(s) SubCutaneous every 8 hours    Cardiac:      Allergies    No Known Allergies    Intolerances        Vital Signs Last 24 Hrs  T(C): 36.4, Max: 36.9 (02-16 @ 09:46)  T(F): 97.6, Max: 98.5 (02-16 @ 09:46)  HR: 89 (84 - 96)  BP: 115/55 (106/64 - 115/55)  BP(mean): --  RR: 20 (16 - 20)  SpO2: 98% (96% - 98%)    I & Os for current day (as of 02-17 @ 08:44)  =============================================  IN: 0 ml / OUT: 500 ml / NET: -500 ml        PHYSICAL EXAM:  General: Well developed; well nourished; in no acute distress  HEENT: PERRL, EOMI, non icteric  Neck: Supple; no masses  Respiratory: Clear to auscultation bilaterally, nowheezing or crackles  Cardiovascular: Regular rate and rhythm. S1 and S2 Normal; No murmurs  Gastrointestinal: Soft non-tender non-distended; Normal bowel sounds  Extremities:WWP, no edema, no cyanosis  Neurological: Alert and oriented x3  Skin: Warm and dry. No obvious rash    LABS:        16 Feb 2017 06:18    144    |  112    |  21     ----------------------------<  101    3.9     |  27     |  0.72     Ca    8.5        16 Feb 2017 06:18  Mg     2.2       16 Feb 2017 06:18                        RADIOLOGY imaging reviewed Interval Events:  Patient seen and examined at bedside. Breathing improved, off HFNC.    MEDICATIONS:  Pulmonary:  ALBUTerol/ipratropium for Nebulization 3milliLiter(s) Nebulizer every 4 hours  sodium chloride 3%  Inhalation 5milliLiter(s) Inhalation once  buDESOnide   0.25 milliGRAM(s) Respule 0.25milliGRAM(s) Inhalation two times a day    Antimicrobials:    Anticoagulants:  heparin  Injectable 5000Unit(s) SubCutaneous every 8 hours    Cardiac:      Allergies    No Known Allergies    Intolerances        Vital Signs Last 24 Hrs  T(C): 36.4, Max: 36.9 (02-16 @ 09:46)  T(F): 97.6, Max: 98.5 (02-16 @ 09:46)  HR: 89 (84 - 96)  BP: 115/55 (106/64 - 115/55)  BP(mean): --  RR: 20 (16 - 20)  SpO2: 98% (96% - 98%)    I & Os for current day (as of 02-17 @ 08:44)  =============================================  IN: 0 ml / OUT: 500 ml / NET: -500 ml        PHYSICAL EXAM:  General: Well developed; well nourished; in no acute distress  HEENT: PERRL, EOMI, non icteric  Neck: Supple;   Respiratory: good BS b/l, mild diffused exp wheezing  Cardiovascular: Regular rate and rhythm. S1 and S2 Normal; No murmurs  Gastrointestinal: Soft non-tender non-distended;  Extremities: WWP, no edema, no cyanosis  Neurological: Alert and oriented  Skin: Warm and dry. No obvious rash    LABS:        16 Feb 2017 06:18    144    |  112    |  21     ----------------------------<  101    3.9     |  27     |  0.72     Ca    8.5        16 Feb 2017 06:18  Mg     2.2       16 Feb 2017 06:18                        RADIOLOGY imaging reviewed

## 2017-02-17 NOTE — PROGRESS NOTE ADULT - SUBJECTIVE AND OBJECTIVE BOX
INTERVAL HPI/OVERNIGHT EVENTS:    VITAL SIGNS:  T(F): 97.6  HR: 89  BP: 115/55  RR: 20  SpO2: 98%  Wt(kg): --    PHYSICAL EXAM:    Constitutional: comfortable, NAD  HEENT: PERRLA, EOMI, MMM, no oral lesions  Respiratory: CTA b/l, no wheezes, rhonchi, or rales  Cardiovascular: RRR, normal S1,S2, no R/M/G  Gastrointestinal: non-distended, nontender, normoactive BS, no hepatosplenomegaly  :   Neurological: AAO x 3, follows all commands  Vascular: pulses 2+ b/l (radial, femoral, PT, DP)  Musculoskeletal: no joint swelling, no erythema  Extremities: WWP, no cyanosis, no clubbing    MEDICATIONS  (STANDING):  ALBUTerol/ipratropium for Nebulization 3milliLiter(s) Nebulizer every 4 hours  insulin lispro (HumaLOG) corrective regimen sliding scale  SubCutaneous Before meals and at bedtime  heparin  Injectable 5000Unit(s) SubCutaneous every 8 hours  sodium chloride 3%  Inhalation 5milliLiter(s) Inhalation once  methimazole 10milliGRAM(s) Oral every 12 hours  predniSONE   Tablet 60milliGRAM(s) Oral daily  buDESOnide   0.25 milliGRAM(s) Respule 0.25milliGRAM(s) Inhalation two times a day    MEDICATIONS  (PRN):  acetaminophen   Tablet. 650milliGRAM(s) Oral every 6 hours PRN Moderate Pain (4 - 6)      Allergies    No Known Allergies    Intolerances        LABS:    16 Feb 2017 06:18    144    |  112    |  21     ----------------------------<  101    3.9     |  27     |  0.72     Ca    8.5        16 Feb 2017 06:18  Mg     2.2       16 Feb 2017 06:18            RADIOLOGY & ADDITIONAL TESTS: INTERVAL HPI/OVERNIGHT EVENTS: no events, weaned off HFNV yesterday, breathing comfortably on 2L NC    VITAL SIGNS:  T(F): 97.6  HR: 89  BP: 115/55  RR: 20  SpO2: 98%  Wt(kg): --    PHYSICAL EXAM:    Constitutional: comfortable, NAD  HEENT: PERRLA, MMM, no oral lesions  Respiratory: mildly decreased breath sounds at bases b/l, no wheezes, rhonchi, or rales - much improved from yesterday  Cardiovascular: RRR, normal S1,S2, no R/M/G  Gastrointestinal: non-distended, nontender, normoactive BS, no hepatosplenomegaly  Neurological: AAO x 3, follows all commands  Vascular: Radial and DP pulses 2+ b/l  Extremities: WWP, no cyanosis, no clubbing    MEDICATIONS  (STANDING):  ALBUTerol/ipratropium for Nebulization 3milliLiter(s) Nebulizer every 4 hours  insulin lispro (HumaLOG) corrective regimen sliding scale  SubCutaneous Before meals and at bedtime  heparin  Injectable 5000Unit(s) SubCutaneous every 8 hours  sodium chloride 3%  Inhalation 5milliLiter(s) Inhalation once  methimazole 10milliGRAM(s) Oral every 12 hours  predniSONE   Tablet 60milliGRAM(s) Oral daily  buDESOnide   0.25 milliGRAM(s) Respule 0.25milliGRAM(s) Inhalation two times a day    MEDICATIONS  (PRN):  acetaminophen   Tablet. 650milliGRAM(s) Oral every 6 hours PRN Moderate Pain (4 - 6)      Allergies    No Known Allergies    Intolerances        LABS:    16 Feb 2017 06:18    144    |  112    |  21     ----------------------------<  101    3.9     |  27     |  0.72     Ca    8.5        16 Feb 2017 06:18  Mg     2.2       16 Feb 2017 06:18            RADIOLOGY & ADDITIONAL TESTS:

## 2017-02-17 NOTE — PROGRESS NOTE ADULT - PROBLEM SELECTOR PLAN 2
Wean off HFNC today Wean off HFNC yesterday, now on NC  - 6MWT before discharge to ensure that the patient does need supplemental O2.

## 2017-02-17 NOTE — PROGRESS NOTE ADULT - PROBLEM SELECTOR PLAN 1
Patient has had recurrent intermittent episodes with tachypnea, increased work of breathing, and distress. Patient has not had desaturation during these events. CT of the chest shows consistent with multifocal moderate infectious/inflammatory. IgE elevated to 488.  bronchiolitis. 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  Respiratory much improved today, weaned off HFNC, now on NC.  CT of the chest with significant tree-in-bud pattern, suggestive of small AW disease   - Aspergillous precipitant still pending still. AFB negative.   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

## 2017-02-17 NOTE — PROGRESS NOTE ADULT - PROBLEM SELECTOR PLAN 1
likely exacerbation in setting of influenza in a poorly controlled asthmatic.  Since admission much improved with bronchodilator therapy and high dose steroids. Started on Tamiflu as it appears she did not complete the course at [facility] 2 weeks ago.   CT of the chest with significant tree-in-bud pattern, suggestive of small AW disease - might all be explained by recent Influenza infection, however could also be ABPA in setting of elevated IgE. AFB negative.   - pending  Aspergillous percipitant   - C/W  Prednisone 40mg daily - will need a long course.  - d/c Advair and start Pulmicort nebs in addition to Duonebs - she should remain on this regimen as out pt as well as it will reach the small AW much better that rehular inhalers. likely exacerbation in setting of influenza in a poorly controlled asthmatic, completed the course of Tamiflu.  Overall much improved with bronchodilator therapy and high dose steroids.   CT of the chest with significant tree-in-bud pattern, suggestive of small AW disease - likely all residue from recent Influenza infection, however could also be ABPA in setting of elevated IgE- Aspergillous percipitant pending still. AFB negative.   - pending  Aspergillous percipitant   - C/W  Prednisone 40mg daily - will keep on this dose for now, needs a long course and slow taper eventually.  - started on Pulmicort nebs in addition to Duonebs - she should remain on this regimen as out pt as well as it will reach the small AW much better that rehular inhalers. likely exacerbation in setting of influenza in a poorly controlled asthmatic, completed the course of Tamiflu.  Overall much improved with bronchodilator therapy and high dose steroids.   CT of the chest with significant tree-in-bud pattern, suggestive of small AW disease - likely all residue from recent Influenza infection, however could also be ABPA in setting of elevated IgE- Aspergillous percipitant pending still. AFB negative.   - pending  Aspergillous percipitant   - C/W  Prednisone 40mg daily - will keep on this dose for now, needs a long course and slow taper eventually.  - started on Pulmicort nebs in addition to Duonebs - she should remain on this regimen as out pt as well as it will reach the small AW much better that conventional inhalers.

## 2017-02-17 NOTE — CONSULT NOTE ADULT - ASSESSMENT
Assessment and Recommendation:   IMP/REC: Ms. Joel is an 82 year old woman suffering from pulmonary symptoms including shortness of breath, asthma, weakness. She lives with her son and his family in the [place]; she recently emigrated from [facility] [place] [facility] to the  a few months ago. Since that time the patient has experienced worsening symptoms of SOB and weakness, yielding multiple hospitalizations. She notes that her son words hard to support her and his family. The pt denies worrying, feeling sad, or any other mood symptoms. It appears plausible that she is suffering a decline in the context of her immigration to a new country and the loss of her  four years ago. The patient reports sufficient support at home, but insufficient finances to afford medication. She would like nebulizer, walked, and oxygen for home. Impression: adjustment disorder. Somatic symptoms appear to be physiological in origin and exacerbated by adjustment to new country and family situation. Presently do not recommend psychotropic medications. Recommend consultation by social work to help problem solve re medications, nebulizer, oxygen, and walker for the home. Recommend consultation with PT to ensure fitness for discharge.    Observation Status: Not required. Assessment and Recommendation:   IMP/REC: Ms. Joel is an 82 year old woman suffering from pulmonary symptoms including shortness of breath, asthma, weakness. She lives with her son and his family in the [place]; she recently emigrated from [facility] to the  a few months ago. Since that time the patient has experienced worsening symptoms of SOB and weakness, yielding multiple hospitalizations. She notes that her son works hard to support her and his family. The pt denies worrying, feeling sad, or any other mood symptoms. It appears plausible that she is suffering a decline in the context of her immigration to a new country and the loss of her  four years ago. The patient reports sufficient support at home, but insufficient finances to afford medication. She would like a nebulizer, walker, and oxygen for home, if feasible.     Ms. 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 Somatic symptoms appear to be physiological in origin and exacerbated by adjustment to new country and family situation.     Would defer using any psychotropic medications at present time.  Recommend SW input regarding assisting pt with medications, ?walker, etc. Consider consultation with PT to ensure fitness for discharge +/- outpt follow up.    Observation Status: No need for C.O.

## 2017-02-17 NOTE — PROGRESS NOTE ADULT - SUBJECTIVE AND OBJECTIVE BOX
INTERVAL HPI/OVERNIGHT EVENTS:    Patient is a 82y old  Female who presents with a chief complaint of dyspnea likely related to HOMA vs aspergillosis with finding of hyperthyroidism.   Unable to obtain ROS due to language barrier, however, patient has been comfortable, in NAD          Pt reports the following symptoms:    CONSTITUTIONAL:  Negative fever or chills, feels well, good appetite  EYES:  Negative  blurry vision or double vision  CARDIOVASCULAR:  Negative for chest pain or palpitations  RESPIRATORY:  Negative for cough, wheezing, or SOB   GASTROINTESTINAL:  Negative for nausea, vomiting, diarrhea, constipation, or abdominal pain  GENITOURINARY:  Negative frequency, urgency or dysuria  NEUROLOGIC:  No headache, confusion, dizziness, lightheadedness    MEDICATIONS  (STANDING):  ALBUTerol/ipratropium for Nebulization 3milliLiter(s) Nebulizer every 4 hours  insulin lispro (HumaLOG) corrective regimen sliding scale  SubCutaneous Before meals and at bedtime  heparin  Injectable 5000Unit(s) SubCutaneous every 8 hours  sodium chloride 3%  Inhalation 5milliLiter(s) Inhalation once  methimazole 10milliGRAM(s) Oral every 12 hours  predniSONE   Tablet 60milliGRAM(s) Oral daily  buDESOnide   0.25 milliGRAM(s) Respule 0.25milliGRAM(s) Inhalation two times a day    MEDICATIONS  (PRN):  acetaminophen   Tablet. 650milliGRAM(s) Oral every 6 hours PRN Moderate Pain (4 - 6)      PHYSICAL EXAM  Vital Signs Last 24 Hrs  T(C): 36.4, Max: 36.4 (02-17 @ 05:30)  T(F): 97.6, Max: 97.6 (02-17 @ 05:30)  HR: 98 (84 - 98)  BP: 115/54 (111/53 - 115/55)  BP(mean): --  RR: 17 (16 - 20)  SpO2: 99% (96% - 99%)    Constitutional: wn/wd in NAD.   HEENT: NCAT, MMM, OP clear, EOMI, , no proptosis or lid retraction  Neck: no thyromegaly or palpable thyroid nodules   Respiratory: lungs CTAB.  Cardiovascular: regular rhythm, normal S1 and S2, no audible murmurs, no peripheral edema  GI: soft, NT/ND, no masses/HSM appreciated.  Neurology: no tremors, DTR 2+  Skin: no visible rashes/lesions  Psychiatric: AAO x 3, normal affect/mood.    LABS:    16 Feb 2017 06:18    144    |  112    |  21     ----------------------------<  101    3.9     |  27     |  0.72     Ca    8.5        16 Feb 2017 06:18  Mg     2.2       16 Feb 2017 06:18          Thyroid Stimulating Hormone, Serum: <0.004 uIU/mL (02-10 @ 06:00)      HbA1C: 6.5 % (02-10 @ 06:00)      CAPILLARY BLOOD GLUCOSE  121 (17 Feb 2017 08:25)  134 (16 Feb 2017 21:16)  226 (16 Feb 2017 17:16)  200 (16 Feb 2017 11:45)      Insulin Sliding Scale requirements X 24 Hours:      RADIOLOGY & ADDITIONAL TESTS:      A/P: 82y Female with history of hyperthyroidism presenting for SOB with concern for infectious cause, found to have a suppressed TSH     1. Hyperthyroid - unknown etiology. diagnosed in [facility] but never medicated. Previously admitted at [facility] with no contrast studies done.   - will c/w Methimazole 10mg twice daily. Check CBC every week for evidence of agranulocytosis and also LFT's once weekly   - f/u results of Thyroid US  - repeat Ft4 tomorrow, on 2/18       Pt can follow up at discharge with [facility] by calling  to make an appointment.   Will discuss case with Dr. Feldman and update primary team INTERVAL HPI/OVERNIGHT EVENTS:    Patient is a 82y old  Female who presents with a chief complaint of dyspnea likely related to HOMA vs aspergillosis with finding of hyperthyroidism.   Unable to obtain ROS due to language barrier, however, patient has been comfortable, in NAD          Pt reports the following symptoms:    CONSTITUTIONAL:  Negative fever or chills, feels well, good appetite  EYES:  Negative  blurry vision or double vision  CARDIOVASCULAR:  Negative for chest pain or palpitations  RESPIRATORY:  Negative for cough, wheezing, or SOB   GASTROINTESTINAL:  Negative for nausea, vomiting, diarrhea, constipation, or abdominal pain  GENITOURINARY:  Negative frequency, urgency or dysuria  NEUROLOGIC:  No headache, confusion, dizziness, lightheadedness    MEDICATIONS  (STANDING):  ALBUTerol/ipratropium for Nebulization 3milliLiter(s) Nebulizer every 4 hours  insulin lispro (HumaLOG) corrective regimen sliding scale  SubCutaneous Before meals and at bedtime  heparin  Injectable 5000Unit(s) SubCutaneous every 8 hours  sodium chloride 3%  Inhalation 5milliLiter(s) Inhalation once  methimazole 10milliGRAM(s) Oral every 12 hours  predniSONE   Tablet 60milliGRAM(s) Oral daily  buDESOnide   0.25 milliGRAM(s) Respule 0.25milliGRAM(s) Inhalation two times a day    MEDICATIONS  (PRN):  acetaminophen   Tablet. 650milliGRAM(s) Oral every 6 hours PRN Moderate Pain (4 - 6)      PHYSICAL EXAM  Vital Signs Last 24 Hrs  T(C): 36.4, Max: 36.4 (02-17 @ 05:30)  T(F): 97.6, Max: 97.6 (02-17 @ 05:30)  HR: 98 (84 - 98)  BP: 115/54 (111/53 - 115/55)  BP(mean): --  RR: 17 (16 - 20)  SpO2: 99% (96% - 99%)    Constitutional: wn/wd in NAD.   HEENT: NCAT, MMM, OP clear, EOMI, , no proptosis or lid retraction  Neck: no thyromegaly or palpable thyroid nodules   Respiratory: lungs CTAB.  Cardiovascular: regular rhythm, normal S1 and S2, no audible murmurs, no peripheral edema  GI: soft, NT/ND, no masses/HSM appreciated.  Neurology: no tremors, DTR 2+  Skin: no visible rashes/lesions  Psychiatric: AAO x 3, normal affect/mood.    LABS:    16 Feb 2017 06:18    144    |  112    |  21     ----------------------------<  101    3.9     |  27     |  0.72     Ca    8.5        16 Feb 2017 06:18  Mg     2.2       16 Feb 2017 06:18          Thyroid Stimulating Hormone, Serum: <0.004 uIU/mL (02-10 @ 06:00)      HbA1C: 6.5 % (02-10 @ 06:00)      CAPILLARY BLOOD GLUCOSE  121 (17 Feb 2017 08:25)  134 (16 Feb 2017 21:16)  226 (16 Feb 2017 17:16)  200 (16 Feb 2017 11:45)      Insulin Sliding Scale requirements X 24 Hours:      RADIOLOGY & ADDITIONAL TESTS:      A/P: 82y Female with history of hyperthyroidism presenting for SOB with concern for infectious cause, found to have a suppressed TSH     1. Hyperthyroid - diagnosed in [facility] but never medicated. Previously admitted at [facility] with no contrast studies done. Thyroid US done, showing multiple, heterogenous nodules.   - would recommend radioactive iodine reuptake study after discharge   - will c/w Methimazole 10mg twice daily. Check CBC every week for evidence of agranulocytosis and also LFT's once weekly   - repeat Ft4 tomorrow, on 2/18 before discharge     Pt can follow up at discharge with [facility] by calling  to make an appointment.   Will discuss case with Dr. Feldman and update primary team

## 2017-02-17 NOTE — PROGRESS NOTE ADULT - PROBLEM SELECTOR PLAN 5
While patient is sleeping, she does not have tachypnea, unclear if there is an element of anxiety involved. When patient is questioned about anxiety she denies it, but as per  on phone she has fast hurried speech pattern. Psych consulted  - f/u Psych recs

## 2017-02-17 NOTE — PROGRESS NOTE ADULT - PROBLEM SELECTOR PLAN 7
Replete lytes to keep K>4 and Mg>2  Regular diet    Dispo: Case mgmt/SW for medication affordability    FULL CODE Replete lytes to keep K>4 and Mg>2  Regular diet    Dispo: [facility]    FULL CODE

## 2017-02-17 NOTE — PROGRESS NOTE ADULT - PROBLEM SELECTOR PLAN 4
most likely 2/2 corticosteroids, no signs of infection, pt afebrile since admission  - monitor for infectious signs/sx  - full fever work up if febrile

## 2017-02-18 LAB — T4 FREE SERPL-MCNC: 1.51 NG/DL — HIGH (ref 0.7–1.48)

## 2017-02-18 PROCEDURE — 99232 SBSQ HOSP IP/OBS MODERATE 35: CPT | Mod: GC

## 2017-02-18 RX ADMIN — Medication 0.25 MILLIGRAM(S): at 05:46

## 2017-02-18 RX ADMIN — Medication: at 07:58

## 2017-02-18 RX ADMIN — Medication 3 MILLILITER(S): at 14:48

## 2017-02-18 RX ADMIN — Medication 3 MILLILITER(S): at 05:46

## 2017-02-18 RX ADMIN — Medication 8: at 13:00

## 2017-02-18 RX ADMIN — HEPARIN SODIUM 5000 UNIT(S): 5000 INJECTION INTRAVENOUS; SUBCUTANEOUS at 22:46

## 2017-02-18 RX ADMIN — Medication 3 MILLILITER(S): at 10:44

## 2017-02-18 RX ADMIN — Medication 40 MILLIGRAM(S): at 05:45

## 2017-02-18 RX ADMIN — Medication 0.25 MILLIGRAM(S): at 17:28

## 2017-02-18 RX ADMIN — HEPARIN SODIUM 5000 UNIT(S): 5000 INJECTION INTRAVENOUS; SUBCUTANEOUS at 05:45

## 2017-02-18 RX ADMIN — Medication 3 MILLILITER(S): at 02:01

## 2017-02-18 RX ADMIN — Medication 3 MILLILITER(S): at 17:27

## 2017-02-18 RX ADMIN — Medication 3 MILLILITER(S): at 22:45

## 2017-02-18 NOTE — PROGRESS NOTE ADULT - SUBJECTIVE AND OBJECTIVE BOX
Patient is a 82y old  Female who presents with a chief complaint of dyspnea. (16 Feb 2017 18:23)      INTERVAL HPI/OVERNIGHT EVENTS: No acute events O/N.     Review of Systems: 12 point review of systems otherwise negative    MEDICATIONS  (STANDING):  ALBUTerol/ipratropium for Nebulization 3milliLiter(s) Nebulizer every 4 hours  insulin lispro (HumaLOG) corrective regimen sliding scale  SubCutaneous Before meals and at bedtime  heparin  Injectable 5000Unit(s) SubCutaneous every 8 hours  sodium chloride 3%  Inhalation 5milliLiter(s) Inhalation once  methimazole 10milliGRAM(s) Oral every 12 hours  buDESOnide   0.25 milliGRAM(s) Respule 0.25milliGRAM(s) Inhalation two times a day  predniSONE   Tablet 40milliGRAM(s) Oral daily    MEDICATIONS  (PRN):  acetaminophen   Tablet. 650milliGRAM(s) Oral every 6 hours PRN Moderate Pain (4 - 6)      Allergies    No Known Allergies    Intolerances          Vital Signs Last 24 Hrs  T(C): 36.2, Max: 36.4 (02-17 @ 18:11)  T(F): 97.2, Max: 97.6 (02-17 @ 18:11)  HR: 90 (75 - 100)  BP: 115/54 (104/63 - 137/66)  BP(mean): --  RR: 17 (17 - 18)  SpO2: 97% (97% - 98%)  CAPILLARY BLOOD GLUCOSE  451 (18 Feb 2017 12:02)  114 (18 Feb 2017 07:39)  148 (17 Feb 2017 21:08)  274 (17 Feb 2017 18:07)        Physical Exam:    Daily     Daily   General:  Well appearing, NAD, not cachetic  HEENT:  Nonicteric, PERRLA  CV:  RRR, no murmur, no JVD  Lungs:  Improving B/L rhonchi  Abdomen:  Soft, non-tender  Extremities:  2+ pulses, no c/c, no edema  Skin:  Warm and dry, no rashes  :  No bell  Neuro:  Non-focal, CN II-XII grossly intact  No Restraints    LABS:                  RADIOLOGY & ADDITIONAL TESTS:    ---------------------------------------------------------------------------  I personally reviewed: [  ]EKG   [  ]CXR    [  ] CT    [  ]Other  ---------------------------------------------------------------------------  PLEASE CHECK WHEN PRESENT:     [  ]Heart Failure     [  ] Acute     [  ] Acute on Chronic     [  ] Chronic  -------------------------------------------------------------------     [  ]Diastolic [HFpEF]     [  ]Systolic [HFrEF]     [  ]Combined [HFpEF & HFrEF]     [  ]Other:  -------------------------------------------------------------------  [  ]KENNY     [  ]ATN     [  ]Reneal Medullary Necrosis     [  ]Renal Cortical Necrosis     [  ]Other Pathological Lesions:    [  ]CKD 1  [  ]CKD 2  [  ]CKD 3  [  ]CKD 4  [  ]CKD 5  [  ]Other  -------------------------------------------------------------------  [  ]Other/Unspecified:    --------------------------------------------------------------------    Abdominal Nutritional Status  [  ]Malnutrition: See Nutrition Note  [  ]Cachexia  [  ]Other:   [  ]Supplement Ordered:  [  ]Morbid Obesity (BMI >=40]

## 2017-02-18 NOTE — PROGRESS NOTE ADULT - PROBLEM SELECTOR PLAN 3
- Endo consulted, Recs appreciated  - C/w methimazole 10mg q12h  - Check CBC every week for evidence of agranulocytosis 2/2 to methimazole and LFTs once weekly.   - f/u thyroid US

## 2017-02-18 NOTE — PROGRESS NOTE ADULT - PROBLEM SELECTOR PLAN 1
likely 2/2 influenza in a poorly controlled asthmatic, completed the course of Tamiflu.  Respiratory much improved today, weaned off HFNC, now on NC.  CT of the chest with significant tree-in-bud pattern, suggestive of small AW disease   - Aspergillous precipitant still pending still. AFB negative.   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

## 2017-02-19 LAB
A FLAVUS AB FLD QL: NEGATIVE — SIGNIFICANT CHANGE UP
A NIGER AB FLD QL: NEGATIVE — SIGNIFICANT CHANGE UP
A NIGER AB FLD QL: SIGNIFICANT CHANGE UP

## 2017-02-19 PROCEDURE — 99232 SBSQ HOSP IP/OBS MODERATE 35: CPT | Mod: GC

## 2017-02-19 RX ADMIN — Medication 3 MILLILITER(S): at 13:16

## 2017-02-19 RX ADMIN — HEPARIN SODIUM 5000 UNIT(S): 5000 INJECTION INTRAVENOUS; SUBCUTANEOUS at 21:34

## 2017-02-19 RX ADMIN — Medication 3 MILLILITER(S): at 01:59

## 2017-02-19 RX ADMIN — Medication 40 MILLIGRAM(S): at 07:20

## 2017-02-19 RX ADMIN — Medication 4: at 13:17

## 2017-02-19 RX ADMIN — Medication 0.25 MILLIGRAM(S): at 07:21

## 2017-02-19 RX ADMIN — Medication 4: at 22:18

## 2017-02-19 RX ADMIN — HEPARIN SODIUM 5000 UNIT(S): 5000 INJECTION INTRAVENOUS; SUBCUTANEOUS at 07:21

## 2017-02-19 RX ADMIN — Medication 3 MILLILITER(S): at 07:21

## 2017-02-19 RX ADMIN — Medication 3 MILLILITER(S): at 21:34

## 2017-02-19 RX ADMIN — HEPARIN SODIUM 5000 UNIT(S): 5000 INJECTION INTRAVENOUS; SUBCUTANEOUS at 13:16

## 2017-02-19 RX ADMIN — Medication 6: at 18:06

## 2017-02-19 RX ADMIN — Medication 0.25 MILLIGRAM(S): at 18:10

## 2017-02-19 RX ADMIN — Medication 3 MILLILITER(S): at 18:05

## 2017-02-19 NOTE — PROGRESS NOTE ADULT - PROBLEM SELECTOR PLAN 5
While patient is sleeping, she does not have tachypnea, unclear if there is an element of anxiety involved. When patient is questioned about anxiety she denies it, but as per  on phone she has fast hurried speech pattern. Psych consulted  - f/u Psych recs HSQ, SCDs; mISS while on corticosteroids

## 2017-02-19 NOTE — PROGRESS NOTE ADULT - PROBLEM SELECTOR PLAN 6
HSQ, SCDs; mISS while on corticosteroids Replete lytes to keep K>4 and Mg>2  Regular diet    Dispo: [facility]    FULL CODE

## 2017-02-19 NOTE — PROGRESS NOTE ADULT - SUBJECTIVE AND OBJECTIVE BOX
INTERVAL HPI/OVERNIGHT EVENTS:    VITAL SIGNS:  T(F): 98.3  HR: 97  BP: 94/57  RR: 17  SpO2: 98%  Wt(kg): --    PHYSICAL EXAM:    Constitutional: comfortable, NAD  HEENT: PERRLA, MMM, no oral lesions  Respiratory: good air movement b/l, no wheezes, rales, rhonchi  Cardiovascular: RRR, normal S1,S2, no R/M/G  Gastrointestinal: non-distended, nontender, normoactive BS, no hepatosplenomegaly  Neurological: AAO x 3, follows all commands  Vascular: Radial and DP pulses 2+ b/l  Extremities: WWP, no cyanosis, no clubbing      MEDICATIONS  (STANDING):  ALBUTerol/ipratropium for Nebulization 3milliLiter(s) Nebulizer every 4 hours  insulin lispro (HumaLOG) corrective regimen sliding scale  SubCutaneous Before meals and at bedtime  heparin  Injectable 5000Unit(s) SubCutaneous every 8 hours  sodium chloride 3%  Inhalation 5milliLiter(s) Inhalation once  methimazole 10milliGRAM(s) Oral every 12 hours  buDESOnide   0.25 milliGRAM(s) Respule 0.25milliGRAM(s) Inhalation two times a day  predniSONE   Tablet 40milliGRAM(s) Oral daily    MEDICATIONS  (PRN):  acetaminophen   Tablet. 650milliGRAM(s) Oral every 6 hours PRN Moderate Pain (4 - 6)      Allergies    No Known Allergies    Intolerances        LABS:                RADIOLOGY & ADDITIONAL TESTS:

## 2017-02-19 NOTE — PROGRESS NOTE ADULT - PROBLEM SELECTOR PLAN 4
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    - Per Psych, defer using any psychotropic medications at this time

## 2017-02-19 NOTE — PROGRESS NOTE ADULT - PROBLEM SELECTOR PLAN 2
Wean off HFNC yesterday, now on NC  - 6MWT before discharge to ensure that the patient does need supplemental O2. Pt breathing comfortably on  RA, lung sounds significantly improved, no wheezing, no accessory muscle use today  - was unable to check 6MWT as pt was anxious, however, now breathing comfortably on RA, will attempt checking pulse ox while ambulating again

## 2017-02-20 PROCEDURE — 99232 SBSQ HOSP IP/OBS MODERATE 35: CPT | Mod: GC

## 2017-02-20 RX ADMIN — Medication 3 MILLILITER(S): at 05:28

## 2017-02-20 RX ADMIN — Medication 3 MILLILITER(S): at 14:10

## 2017-02-20 RX ADMIN — Medication 3 MILLILITER(S): at 21:48

## 2017-02-20 RX ADMIN — HEPARIN SODIUM 5000 UNIT(S): 5000 INJECTION INTRAVENOUS; SUBCUTANEOUS at 14:10

## 2017-02-20 RX ADMIN — Medication 2: at 08:31

## 2017-02-20 RX ADMIN — Medication 40 MILLIGRAM(S): at 05:28

## 2017-02-20 RX ADMIN — Medication 2: at 12:52

## 2017-02-20 RX ADMIN — Medication 3 MILLILITER(S): at 09:58

## 2017-02-20 RX ADMIN — Medication 3 MILLILITER(S): at 17:16

## 2017-02-20 RX ADMIN — Medication 4: at 17:14

## 2017-02-20 RX ADMIN — Medication 0.25 MILLIGRAM(S): at 09:58

## 2017-02-20 RX ADMIN — Medication 0.25 MILLIGRAM(S): at 17:16

## 2017-02-20 RX ADMIN — HEPARIN SODIUM 5000 UNIT(S): 5000 INJECTION INTRAVENOUS; SUBCUTANEOUS at 05:27

## 2017-02-20 NOTE — PROGRESS NOTE ADULT - SUBJECTIVE AND OBJECTIVE BOX
INTERVAL HPI/OVERNIGHT EVENTS:  Patient was seen and examined at bedside. As per nurse and patient, no o/n events, patient resting comfortably. No complaints at this time. Patient denies fever, chills, dizziness, weakness, HA, Changes in vision, CP, palpitations, SOB, cough, N/V/D/C, dysuria, changes in bowel movements, LE edema.    VITAL SIGNS:  T(F): 97.1  HR: 88  BP: 112/54  RR: 16  SpO2: 98%  Wt(kg): --    PHYSICAL EXAM:    Constitutional: WDWN, NAD  Eyes: PERRL, EOMI, sclera non-icteric  Neck: supple, trachea midline, no masses, no JVD  Respiratory: CTA b/l, good air entry b/l, no wheezing, rhonchi, rales, without accessory muscle use and no intercostal retractions  Cardiovascular: RRR, normal S1S2, no M/R/G  Gastrointestinal: soft, NTND, no masses palpable, BS normal  Extremities: Warm, well perfused, pulses equal bilateral upper and lower extremities, no edema, no clubbing  Neurological: AAOx3, CN Grossly intact  Skin: Normal temperature, warm, dry    MEDICATIONS  (STANDING):  ALBUTerol/ipratropium for Nebulization 3milliLiter(s) Nebulizer every 4 hours  insulin lispro (HumaLOG) corrective regimen sliding scale  SubCutaneous Before meals and at bedtime  heparin  Injectable 5000Unit(s) SubCutaneous every 8 hours  sodium chloride 3%  Inhalation 5milliLiter(s) Inhalation once  methimazole 10milliGRAM(s) Oral every 12 hours  buDESOnide   0.25 milliGRAM(s) Respule 0.25milliGRAM(s) Inhalation two times a day  predniSONE   Tablet 40milliGRAM(s) Oral daily    MEDICATIONS  (PRN):  acetaminophen   Tablet. 650milliGRAM(s) Oral every 6 hours PRN Moderate Pain (4 - 6)      Allergies    No Known Allergies    Intolerances        LABS:                RADIOLOGY & ADDITIONAL TESTS:

## 2017-02-20 NOTE — PROGRESS NOTE ADULT - PROBLEM SELECTOR PLAN 2
Pt breathing comfortably on  RA, lung sounds significantly improved, no wheezing, no accessory muscle use today  - was unable to check 6MWT as pt was anxious, however, now breathing comfortably on RA, will attempt checking pulse ox while ambulating again

## 2017-02-20 NOTE — PROGRESS NOTE ADULT - PROBLEM SELECTOR PLAN 4
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    - Per Psych, defer using any psychotropic medications at this time

## 2017-02-20 NOTE — PROGRESS NOTE ADULT - PROBLEM SELECTOR PLAN 6
Replete lytes to keep K>4 and Mg>2  Regular diet    Dispo: [facility], accepted, waiting on bed.     FULL CODE

## 2017-02-21 VITALS
SYSTOLIC BLOOD PRESSURE: 101 MMHG | OXYGEN SATURATION: 97 % | TEMPERATURE: 97 F | RESPIRATION RATE: 17 BRPM | DIASTOLIC BLOOD PRESSURE: 49 MMHG | HEART RATE: 87 BPM

## 2017-02-21 PROCEDURE — 99239 HOSP IP/OBS DSCHRG MGMT >30: CPT

## 2017-02-21 RX ORDER — IPRATROPIUM/ALBUTEROL SULFATE 18-103MCG
3 AEROSOL WITH ADAPTER (GRAM) INHALATION
Qty: 2 | Refills: 0 | OUTPATIENT
Start: 2017-02-21 | End: 2017-03-23

## 2017-02-21 RX ORDER — METHIMAZOLE 10 MG/1
1 TABLET ORAL
Qty: 60 | Refills: 0 | OUTPATIENT
Start: 2017-02-21 | End: 2017-03-23

## 2017-02-21 RX ORDER — BUDESONIDE, MICRONIZED 100 %
0.25 POWDER (GRAM) MISCELLANEOUS
Qty: 10 | Refills: 0 | OUTPATIENT
Start: 2017-02-21 | End: 2017-03-23

## 2017-02-21 RX ADMIN — HEPARIN SODIUM 5000 UNIT(S): 5000 INJECTION INTRAVENOUS; SUBCUTANEOUS at 05:02

## 2017-02-21 RX ADMIN — Medication 3 MILLILITER(S): at 05:01

## 2017-02-21 RX ADMIN — Medication 0.25 MILLIGRAM(S): at 05:01

## 2017-02-21 RX ADMIN — Medication 3 MILLILITER(S): at 13:58

## 2017-02-21 RX ADMIN — Medication 3 MILLILITER(S): at 10:12

## 2017-02-21 RX ADMIN — Medication 40 MILLIGRAM(S): at 05:00

## 2017-02-21 RX ADMIN — HEPARIN SODIUM 5000 UNIT(S): 5000 INJECTION INTRAVENOUS; SUBCUTANEOUS at 13:58

## 2017-02-21 NOTE — PROGRESS NOTE ADULT - PROBLEM SELECTOR PLAN 3
* Endo recs appreciated  * C/w methimazole 10mg q12h  * Check CBC every week for evidence of agranulocytosis 2/2 to methimazole and LFTs once weekly.   * f/u thyroid US

## 2017-02-21 NOTE — PROGRESS NOTE ADULT - PROBLEM SELECTOR PLAN 4
Likely Adjustment Disorder with Anxious Mood.    * Per Psych, defer using any psychotropic medications at this time  * Outpatient follow up.

## 2017-02-21 NOTE — PROGRESS NOTE ADULT - SUBJECTIVE AND OBJECTIVE BOX
SOB improved.  No CP.  No overnight events.  Rest of ROS negative.    Vital Signs Last 24 Hrs  T(C): 36.1, Max: 36.7 (02-20 @ 20:46)  T(F): 97, Max: 98 (02-20 @ 20:46)  HR: 87 (74 - 90)  BP: 101/49 (94/52 - 108/56)  BP(mean): --  RR: 17 (17 - 18)  SpO2: 97% (97% - 99%)    PHYSICAL EXAMINATION  * General: Not in acute distress. Awake and alert. Lying comfortably in bed.  * Head: Normocephalic, atraumatic.  * HEENT: ears no discharge, eyes PERRLA, nose no discharge, throat no exudates, normal tonsils.  * Neck: no JVD, supple.  * Lungs: Mildly wheezy. No rales.  * Cardio: Regular rate and rhythm, no murmurs, no rubs, no gallops. Good peripheral pulses.  * Abdomen: Soft, non-tender, non-distended, tympanic to percussion, no rebound, no guarding, no rigidity. Bowel sounds present. No suprapubic or CVA tenderness.  * : Deferred.  * Extremities: Acyanotic, no edema.  * Skin: Warm and dry.  * Neuro: Alert and oriented x 3. No focal deficits. Motor strength is 5/5 throughout. Sensation intact. Cranial nerves II-XII grossly intact.     MEDICATIONS  (STANDING):  ALBUTerol/ipratropium for Nebulization 3milliLiter(s) Nebulizer every 4 hours  insulin lispro (HumaLOG) corrective regimen sliding scale  SubCutaneous Before meals and at bedtime  heparin  Injectable 5000Unit(s) SubCutaneous every 8 hours  sodium chloride 3%  Inhalation 5milliLiter(s) Inhalation once  methimazole 10milliGRAM(s) Oral every 12 hours  buDESOnide   0.25 milliGRAM(s) Respule 0.25milliGRAM(s) Inhalation two times a day  predniSONE   Tablet 40milliGRAM(s) Oral daily    MEDICATIONS  (PRN):  acetaminophen   Tablet. 650milliGRAM(s) Oral every 6 hours PRN Moderate Pain (4 - 6)

## 2017-02-21 NOTE — PROGRESS NOTE ADULT - ATTENDING COMMENTS
82 year old lady with asthma, poorly controlled.   She has had multiple admissions to the hospital. Never intubated. No H/O po steroids  Had influenza on 2/7/17 at [facility]. She was admitted to ICU for respiratory failure. Improved significantly. Has occasional episodes of wheezing and shortness of breath. She responds to bronchodilators and NIPPV  She also has a history of anxiety. These episodes occur during waking hours  CXR/CT scan reviewed:  No evidence of air trapping. Some tree-in-bud opacities. Some thickening of airways suggestive of airways inflammation  Sputum for AFB (HOMA) and mycology pending  On prednisone for now.  Agree with singh. Add nebulized pulmocort.  The patient may have asthma/COPD overlap syndrome
Appears euthyroid Her son brought previous hospital records No US was there so suggest getting one here. Continue methimazole.
Thyroid US shows MNG. So should have a radio-iodine uptake to investigate possible hot nodule. If discharged this could be organized as an out patient. Appears euthyroid, Heavy acanthosis noted eye lids.
Hard modestly enlarged thyroid. Clinically appears euthyroid Pulse 88, To have Humalog corrections Is still on prednisone 60mg Methimazole 5 mg bid. Suggest thyroid US. If nodules, then a radio-iodine uptake may be justified if toxic nodule considered. The thyroid palpates however like chronic lymphocytic thyroiditis.
Condition improved. 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 Treat with triple therapy  Will taper prednisone as out-patient   Full PFTs as out-patient.  Will suggest 6MWT before discharge to ensure that the patient does need supplemental O2
DC to [facility] today.
pt seen awake and alert. on NCO2 and feeling SOB but tolerated physical therapy. started  high flow NCO2 and to continue steroids and further evaluation per pulmonary.

## 2017-02-21 NOTE — PROGRESS NOTE ADULT - PROVIDER SPECIALTY LIST ADULT
Endocrinology
Hospitalist
Internal Medicine
MICU
Pulmonology
Hospitalist
Internal Medicine

## 2017-02-21 NOTE — PROGRESS NOTE ADULT - PROBLEM SELECTOR PLAN 1
Improved.   * c/w alb nebs, inhaled steroid and prednisone.  * Pulm following, recs appreciated. Need to c/w prednisone. Slow taper, per pulm.

## 2017-02-21 NOTE — PROGRESS NOTE ADULT - ASSESSMENT
83yo F w/ PMH hyperthyroidism, extensive hx asthma w/ exacerbations and hospitalizations x3 this year presents with asthma exacerbation, with persistent wheezing and intermittent dyspnea despite treatment
81 yo F, PMH of Asthma, now with asthma exacerbation and intermittent episodes of wheezing.
81yo F w/ PMH hyperthyroidism, extensive hx asthma w/ exacerbations and hospitalizations x3 this year presents with asthma exacerbation, with persistent wheezing and intermittent dyspnea despite treatment
83 yo F, PMH of Asthma, now with asthma exacerbation and intermittent episodes of wheezing.
83 yo F, PMH of Asthma, now with asthma exacerbation and intermittent episodes of wheezing.
83yo F w/ PMH hyperthyroidism, extensive hx asthma w/ exacerbations and hospitalizations x3 this year presenting with asthma exacerbation with continued tachypnea, dyspnea, and wheezing despite treatment, now with positive IgE titers, stable for stepdown to [facility]
83yo F w/ PMH hyperthyroidism, extensive hx asthma w/ exacerbations and hospitalizations x3 this year presents with asthma exacerbation, with persistent wheezing and intermittent dyspnea despite treatment
83yo F w/ PMH hyperthyroidism, severe progressive asthma and multiple hospitalizations for exacerbations. Admitted for asthma/copd exacerbation.
81 yo F, PMH of Asthma, now with asthma exacerbation and intermittent episodes of wheezing.
81yo F w/ PMH hyperthyroidism, extensive hx asthma w/ exacerbations and hospitalizations x3 this year presenting with asthma exacerbation with continued respiratory distress, dyspnea, and wheezing despite treatment

## 2017-02-23 DIAGNOSIS — T38.2X5A ADVERSE EFFECT OF ANTITHYROID DRUGS, INITIAL ENCOUNTER: ICD-10-CM

## 2017-02-23 DIAGNOSIS — J96.01 ACUTE RESPIRATORY FAILURE WITH HYPOXIA: ICD-10-CM

## 2017-02-23 DIAGNOSIS — E05.90 THYROTOXICOSIS, UNSPECIFIED WITHOUT THYROTOXIC CRISIS OR STORM: ICD-10-CM

## 2017-02-23 DIAGNOSIS — F43.20 ADJUSTMENT DISORDER, UNSPECIFIED: ICD-10-CM

## 2017-02-23 DIAGNOSIS — D72.829 ELEVATED WHITE BLOOD CELL COUNT, UNSPECIFIED: ICD-10-CM

## 2017-02-23 DIAGNOSIS — J21.9 ACUTE BRONCHIOLITIS, UNSPECIFIED: ICD-10-CM

## 2017-02-23 DIAGNOSIS — T38.0X5A ADVERSE EFFECT OF GLUCOCORTICOIDS AND SYNTHETIC ANALOGUES, INITIAL ENCOUNTER: ICD-10-CM

## 2017-02-23 DIAGNOSIS — J45.901 UNSPECIFIED ASTHMA WITH (ACUTE) EXACERBATION: ICD-10-CM

## 2017-02-23 DIAGNOSIS — J44.9 CHRONIC OBSTRUCTIVE PULMONARY DISEASE, UNSPECIFIED: ICD-10-CM

## 2017-02-23 DIAGNOSIS — F41.9 ANXIETY DISORDER, UNSPECIFIED: ICD-10-CM

## 2017-02-23 DIAGNOSIS — D70.2 OTHER DRUG-INDUCED AGRANULOCYTOSIS: ICD-10-CM

## 2017-02-23 DIAGNOSIS — J44.1 CHRONIC OBSTRUCTIVE PULMONARY DISEASE WITH (ACUTE) EXACERBATION: ICD-10-CM

## 2017-02-23 DIAGNOSIS — Z91.14 PATIENT'S OTHER NONCOMPLIANCE WITH MEDICATION REGIMEN: ICD-10-CM

## 2017-02-23 DIAGNOSIS — J09.X2 INFLUENZA DUE TO IDENTIFIED NOVEL INFLUENZA A VIRUS WITH OTHER RESPIRATORY MANIFESTATIONS: ICD-10-CM

## 2017-02-23 DIAGNOSIS — F32.9 MAJOR DEPRESSIVE DISORDER, SINGLE EPISODE, UNSPECIFIED: ICD-10-CM

## 2017-03-12 PROCEDURE — 84436 ASSAY OF TOTAL THYROXINE: CPT

## 2017-03-12 PROCEDURE — 84100 ASSAY OF PHOSPHORUS: CPT

## 2017-03-12 PROCEDURE — 87116 MYCOBACTERIA CULTURE: CPT

## 2017-03-12 PROCEDURE — 97116 GAIT TRAINING THERAPY: CPT

## 2017-03-12 PROCEDURE — 96374 THER/PROPH/DIAG INJ IV PUSH: CPT

## 2017-03-12 PROCEDURE — 99291 CRITICAL CARE FIRST HOUR: CPT | Mod: 25

## 2017-03-12 PROCEDURE — 94660 CPAP INITIATION&MGMT: CPT

## 2017-03-12 PROCEDURE — 76536 US EXAM OF HEAD AND NECK: CPT

## 2017-03-12 PROCEDURE — 97161 PT EVAL LOW COMPLEX 20 MIN: CPT

## 2017-03-12 PROCEDURE — 83880 ASSAY OF NATRIURETIC PEPTIDE: CPT

## 2017-03-12 PROCEDURE — 87015 SPECIMEN INFECT AGNT CONCNTJ: CPT

## 2017-03-12 PROCEDURE — 87206 SMEAR FLUORESCENT/ACID STAI: CPT

## 2017-03-12 PROCEDURE — 71250 CT THORAX DX C-: CPT

## 2017-03-12 PROCEDURE — 85027 COMPLETE CBC AUTOMATED: CPT

## 2017-03-12 PROCEDURE — 85610 PROTHROMBIN TIME: CPT

## 2017-03-12 PROCEDURE — 84480 ASSAY TRIIODOTHYRONINE (T3): CPT

## 2017-03-12 PROCEDURE — 97530 THERAPEUTIC ACTIVITIES: CPT

## 2017-03-12 PROCEDURE — 71046 X-RAY EXAM CHEST 2 VIEWS: CPT

## 2017-03-12 PROCEDURE — 84439 ASSAY OF FREE THYROXINE: CPT

## 2017-03-12 PROCEDURE — 80053 COMPREHEN METABOLIC PANEL: CPT

## 2017-03-12 PROCEDURE — 82785 ASSAY OF IGE: CPT

## 2017-03-12 PROCEDURE — 83036 HEMOGLOBIN GLYCOSYLATED A1C: CPT

## 2017-03-12 PROCEDURE — 93306 TTE W/DOPPLER COMPLETE: CPT

## 2017-03-12 PROCEDURE — 86606 ASPERGILLUS ANTIBODY: CPT

## 2017-03-12 PROCEDURE — 82803 BLOOD GASES ANY COMBINATION: CPT

## 2017-03-12 PROCEDURE — 85025 COMPLETE CBC W/AUTO DIFF WBC: CPT

## 2017-03-12 PROCEDURE — 83516 IMMUNOASSAY NONANTIBODY: CPT

## 2017-03-12 PROCEDURE — 96375 TX/PRO/DX INJ NEW DRUG ADDON: CPT

## 2017-03-12 PROCEDURE — 93005 ELECTROCARDIOGRAM TRACING: CPT

## 2017-03-12 PROCEDURE — 36415 COLL VENOUS BLD VENIPUNCTURE: CPT

## 2017-03-12 PROCEDURE — 80198 ASSAY OF THEOPHYLLINE: CPT

## 2017-03-12 PROCEDURE — 71045 X-RAY EXAM CHEST 1 VIEW: CPT

## 2017-03-12 PROCEDURE — 85730 THROMBOPLASTIN TIME PARTIAL: CPT

## 2017-03-12 PROCEDURE — 84443 ASSAY THYROID STIM HORMONE: CPT

## 2017-03-12 PROCEDURE — 83735 ASSAY OF MAGNESIUM: CPT

## 2017-03-12 PROCEDURE — 94640 AIRWAY INHALATION TREATMENT: CPT

## 2017-03-12 PROCEDURE — 87070 CULTURE OTHR SPECIMN AEROBIC: CPT

## 2017-03-12 PROCEDURE — 83605 ASSAY OF LACTIC ACID: CPT

## 2017-03-12 PROCEDURE — 94760 N-INVAS EAR/PLS OXIMETRY 1: CPT

## 2017-03-12 PROCEDURE — 85048 AUTOMATED LEUKOCYTE COUNT: CPT

## 2017-03-12 PROCEDURE — 80048 BASIC METABOLIC PNL TOTAL CA: CPT

## 2017-04-01 LAB
CULTURE RESULTS: SIGNIFICANT CHANGE UP
CULTURE RESULTS: SIGNIFICANT CHANGE UP
SPECIMEN SOURCE: SIGNIFICANT CHANGE UP
SPECIMEN SOURCE: SIGNIFICANT CHANGE UP

## 2021-03-15 NOTE — PROGRESS NOTE ADULT - PROBLEM SELECTOR PLAN 6
To CT on cart   Replete lytes to keep K>4 and Mg>2  Regular diet    Dispo: Continue care/management on stepdown floor  FULL CODE

## 2021-09-28 NOTE — PROGRESS NOTE ADULT - SUBJECTIVE AND OBJECTIVE BOX
PGY-1 TRANSFER NOTE  Hospital course: Patient is an 83yo F recently emigrated from [facility] (11/2016) w/ PMH hyperthyroidism, 25 year hx of asthma (w/ recent ER visit at [facility] for exacerbation w/ +flu, received albuterol/prednisone/tamiflu, d/c-ed on theophylline; no hx of intubations). Pt was unable to fill her meds 2/2 insurance issues and then presented to [facility] 2/9 w/ worsening dyspnea, cough, and unable to minimally ambulate w/o shortness of breath w/ 3 additional hospitalizations for asthma this past year. In the ED patient had respiratory distress and was placed on BiPAP. She began intermittently taking off mask and ambulating at which time she became progressively more dyspneic w/ sat coming down to 95% on RA off BiPAP and subsequently RR up to 40, and was admitted to the ICU. Pt was admitted to the ICU from 2/10-2/13. While in the ICU pt was treated for an asthma exacerbation with prednisone, advair, duonebs. Ativan 0.5 BID was added on with suspicion that there was a component of anxiety triggering asthma. On 2/12 AM pt was tachynepic after exertion; placed on bipap with improvement. Pt did not have further need for bipap after this episode. However pt continued to be tachypneic during ICU visit. Pt had further workup w/ CT chest demonstrated findings consistent with multifocal moderate infectious/inflammatory bronchiolitis. Pulm consulted given appropriate treatment and lack of clinical improvement as pt still tachypneic. Went for echo, EF60% w/ mild impaired LV relaxation. Pt stepped down to [facility]. While in [facility] patient found to elevated IgE at 488. Patient was placed on HFNC intermittently for increased work of breathing, however patient never had desaturation. Endocrine consulted for history of hyperthyroid, and ordered for ultrasound of thyroid. Otherwise, VSS and patient is hemodynamically stable for step down to regional medical floor. Considering patient's immigration history, pending issues are access to medications on discharge, diagnosis of underlying lung pathology and proper treatment, diagnosis of possible underlying anxiety, and care coordination/managemet.       Overnight Events: REGI    Subjective: Patient seen and examined at bedside. ROS limited, however patient denies any pain.     [OBJECTIVE]:    Vital Signs:  T(F): , Max: 98.4 (02-14 @ 17:38)  HR:  (82 - 108)  BP:  (107/55 - 129/60)  BP(mean):  (73 - 86)  RR:  (18 - 24)  SpO2:  (97% - 100%)  Wt(kg): --  CVP(cm H2O): --      CAPILLARY BLOOD GLUCOSE  295 (15 Feb 2017 11:34)      Physcial Exam:  T(F): 98.3  HR: 94  BP: 127/59  RR: 18  SpO2: 98%  Wt(kg): --    General: Looks younger than stated age, AO x 3, NAD, Anxious, Agitated, Ill  HEENT: PERRL/ EOMI, no scleral icterus, no ptosis, MMM, JVD, no thyromegaly or nodules palpated  Respiratory: B/L ronchorous throughout, wheezing in bases, Tachypneic to 24. When speaking patient is able to speak in full sentences.  Cardiovascular: Regular, +S1 + S2  Abdomen: Soft, NTND, normoactive bowel sounds, no rebound, no guarding, no suprapubic tenderness  Extremities: No cyanosis, no clubbing, no edema, pulses equal, no calf tenderness  Lymphatic: No cervical/supraclavicular LAD  Neurological: CN II-XII grossly intact, moves all extremities    Medications:  MEDICATIONS  (STANDING):  ALBUTerol/ipratropium for Nebulization 3milliLiter(s) Nebulizer every 4 hours  insulin lispro (HumaLOG) corrective regimen sliding scale  SubCutaneous Before meals and at bedtime  fluticasone / salmeterol 250-50 MICROgram(s) Diskus 1Dose(s) Inhalation two times a day  heparin  Injectable 5000Unit(s) SubCutaneous every 8 hours  LORazepam     Tablet 0.5milliGRAM(s) Oral every 12 hours  sodium chloride 3%  Inhalation 5milliLiter(s) Inhalation once  methimazole 10milliGRAM(s) Oral every 12 hours    MEDICATIONS  (PRN):  acetaminophen   Tablet. 650milliGRAM(s) Oral every 6 hours PRN Moderate Pain (4 - 6)      Allergies:  Allergies    No Known Allergies    Intolerances        Labs:                        12.7   18.8  )-----------( 284      ( 15 Feb 2017 06:30 )             38.9     15 Feb 2017 06:30    144    |  112    |  27     ----------------------------<  120    3.7     |  23     |  0.80     Ca    8.8        15 Feb 2017 06:30  Phos  4.5       15 Feb 2017 06:30  Mg     2.6       15 Feb 2017 06:30    TPro  7.1    /  Alb  2.8    /  TBili  0.6    /  DBili  x      /  AST  19     /  ALT  39     /  AlkPhos  69     14 Feb 2017 11:45          Radiology and other tests:  Reviewed negative...
